# Patient Record
Sex: MALE | Race: OTHER | Employment: UNEMPLOYED | URBAN - METROPOLITAN AREA
[De-identification: names, ages, dates, MRNs, and addresses within clinical notes are randomized per-mention and may not be internally consistent; named-entity substitution may affect disease eponyms.]

---

## 2023-01-10 ENCOUNTER — APPOINTMENT (OUTPATIENT)
Dept: GENERAL RADIOLOGY | Facility: HOSPITAL | Age: 48
End: 2023-01-10

## 2023-01-10 ENCOUNTER — HOSPITAL ENCOUNTER (EMERGENCY)
Facility: HOSPITAL | Age: 48
Discharge: HOME | End: 2023-01-10
Attending: EMERGENCY MEDICINE

## 2023-01-10 ENCOUNTER — HOSPITAL ENCOUNTER (EMERGENCY)
Facility: HOSPITAL | Age: 48
Discharge: STILL ADMITTED OR ADMINISTRATIVE | End: 2023-01-10

## 2023-01-10 VITALS
RESPIRATION RATE: 18 BRPM | OXYGEN SATURATION: 97 % | BODY MASS INDEX: 27.17 KG/M2 | TEMPERATURE: 98 F | HEIGHT: 71.65 IN | WEIGHT: 198.44 LBS | DIASTOLIC BLOOD PRESSURE: 66 MMHG | SYSTOLIC BLOOD PRESSURE: 122 MMHG | HEART RATE: 52 BPM

## 2023-01-10 DIAGNOSIS — S89.91XA RIGHT KNEE INJURY, INITIAL ENCOUNTER: Primary | ICD-10-CM

## 2023-01-10 PROCEDURE — 99283 EMERGENCY DEPT VISIT LOW MDM: CPT

## 2023-01-10 PROCEDURE — 99284 EMERGENCY DEPT VISIT MOD MDM: CPT

## 2023-01-10 PROCEDURE — 96372 THER/PROPH/DIAG INJ SC/IM: CPT

## 2023-01-10 PROCEDURE — 73560 X-RAY EXAM OF KNEE 1 OR 2: CPT

## 2023-01-10 RX ORDER — KETOROLAC TROMETHAMINE 10 MG/1
10 TABLET, FILM COATED ORAL EVERY 6 HOURS PRN
Qty: 20 TABLET | Refills: 0 | Status: SHIPPED | OUTPATIENT
Start: 2023-01-10 | End: 2023-01-15

## 2023-01-10 RX ORDER — KETOROLAC TROMETHAMINE 30 MG/ML
30 INJECTION, SOLUTION INTRAMUSCULAR; INTRAVENOUS ONCE
Status: COMPLETED | OUTPATIENT
Start: 2023-01-10 | End: 2023-01-10

## 2023-01-10 NOTE — ED INITIAL ASSESSMENT (HPI)
The patient reports that he injured his right knee while walking yesterday evening when he felt a \"pop\". He has been unable to ambulate since the event.

## 2023-01-19 ENCOUNTER — EMERGENCY (EMERGENCY)
Facility: HOSPITAL | Age: 48
LOS: 0 days | Discharge: HOME | End: 2023-01-19
Attending: EMERGENCY MEDICINE | Admitting: EMERGENCY MEDICINE
Payer: MEDICAID

## 2023-01-19 VITALS
DIASTOLIC BLOOD PRESSURE: 60 MMHG | OXYGEN SATURATION: 99 % | SYSTOLIC BLOOD PRESSURE: 133 MMHG | TEMPERATURE: 98 F | RESPIRATION RATE: 17 BRPM | HEART RATE: 60 BPM | WEIGHT: 184.97 LBS
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
## 2023-01-19 DIAGNOSIS — M25.561 PAIN IN RIGHT KNEE: ICD-10-CM
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
## 2023-01-19 PROCEDURE — 73562 X-RAY EXAM OF KNEE 3: CPT | Mod: 26,RT

## 2023-01-19 PROCEDURE — 99284 EMERGENCY DEPT VISIT MOD MDM: CPT

## 2023-01-19 NOTE — ED PROVIDER NOTE - PHYSICAL EXAMINATION
CONST: Well appearing in NAD  CARD: Normal S1 S2; Normal rate and rhythm  RESP: Equal BS B/L, No wheezes, rhonchi or rales. No distress  MS: right knee with suprapatellar swelling and tenderness overlying medial aspect. Mild laxity.   SKIN: Warm, dry, no acute rashes. Good turgor  NEURO: A&Ox3, No focal deficits. Strength 5/5 with no sensory deficits.

## 2023-01-19 NOTE — ED PROVIDER NOTE - ATTENDING APP SHARED VISIT CONTRIBUTION OF CARE
47-year-old male with no significant past medical history presents with right knee pain for the past 5 days after he was playing football, states he jumped up and when he landed he twisted his right knee.  Pain has been sharp, constant, nonradiating, worse with movement, better at rest, moderate in intensity.  Patient has not taken anything for the pain and does not want anything.  Associated symptoms include mild right knee swelling.  denies fever, chills, n/v, cp, sob, pleuritic cp pain, palpitations, diaphoresis, numbness/tingling, decreased sensation,  ankle pain, hip pain, lacerations, abrasions, ecchymoses.     On Exam: Vital Signs: I have reviewed the initial vital signs. Constitutional: Non toxic appearing pt sitting on stretcher. Integumentary: No rash. No lacerations, abrasions, ecchymoses. R mild anterior knee swelling. no erythema  or streaking, no warmth to palpation, no crepitus, induration, fluctuance, no discharge, no signs of trauma, no abscess, (+) soft compartments.  Cardiovascular: DP and PT pulses 2/4 b/l. Musculoskeletal: FROM of R knee in flexion, extension, pain worse with flexion of R knee, FROM of R ankle in plantar and dorsi flexion, inversion and eversion, Pain to palpation over R anterior knee, No pain to palpation over ankle, fibular heads, or patella. (-) Anterior and Posterior drawer tests. (-) Bhandari test. (-) Lachman (-) Sasha's. No edema, no calf pain/swelling/erythema. no hip pain to palpation, no short leg, no internal or external rotation of LE. Neurologic: AAOx3, motor 5/5 and sensation intact throughout upper and lower ext.    Plan: pt does not want anything for pain at this time, R Knee xray, reassess.

## 2023-01-19 NOTE — ED PROVIDER NOTE - NS ED ATTENDING STATEMENT MOD
This was a shared visit with the E[facility]RA[facility]. I reviewed and verified the documentation and independently performed the documented:

## 2023-01-19 NOTE — ED PROVIDER NOTE - CLINICAL SUMMARY MEDICAL DECISION MAKING FREE TEXT BOX
47-year-old male with no significant past medical history presents with right knee pain for the past 5 days after he was playing football, states he jumped up and when he landed he twisted his right knee.  Pain has been sharp, constant, nonradiating, worse with movement, better at rest, moderate in intensity.  Patient has not taken anything for the pain and does not want anything.  Associated symptoms include mild right knee swelling.  denies fever, chills, n/v, cp, sob, pleuritic cp pain, palpitations, diaphoresis, numbness/tingling, decreased sensation,  ankle pain, hip pain, lacerations, abrasions, ecchymoses.     On Exam: Vital Signs: I have reviewed the initial vital signs. Constitutional: Non toxic appearing pt sitting on stretcher. Integumentary: No rash. No lacerations, abrasions, ecchymoses. R mild anterior knee swelling. no erythema  or streaking, no warmth to palpation, no crepitus, induration, fluctuance, no discharge, no signs of trauma, no abscess, (+) soft compartments.  Cardiovascular: DP and PT pulses 2/4 b/l. Musculoskeletal: FROM of R knee in flexion, extension, pain worse with flexion of R knee, FROM of R ankle in plantar and dorsi flexion, inversion and eversion, Pain to palpation over R anterior knee, No pain to palpation over ankle, fibular heads, or patella. (-) Anterior and Posterior drawer tests. (-) Bhandari test. (-) Lachman (-) Sasha's. No edema, no calf pain/swelling/erythema. no hip pain to palpation, no short leg, no internal or external rotation  of LE. Neurologic: AAOx3, motor 5/5 and sensation intact throughout upper and lower ext.    Pt with no signs of septic joint, no overlying skin changes. no erythema  or streaking, no warmth to palpation, no crepitus, induration, fluctuance, no discharge, no signs of trauma, no abscess, (+) soft compartments. Xray with no fracture noted.  Imaging was ordered and reviewed by me.  Pt did not want anything for the knee pain. Additional history was obtained from family- wife.  Escalation to admission/observation was considered.  However patient feels much better and is comfortable with discharge.  Appropriate follow-up was arranged. Will follow up with ortho.

## 2023-01-19 NOTE — ED PROVIDER NOTE - NSFOLLOWUPINSTRUCTIONS_ED_ALL_ED_FT
Our Emergency Department Referral Coordinators will be reaching out to you in the next 24-48 hours from 9:00am to 5:00pm with a follow up appointment. Please expect a phone call from the hospital in that time frame. If you do not receive a call or if you have any questions or concerns, you can reach them at [phone] or [phone].        How to Use a Knee Immobilizer    An immobilizer on a person's knee.   A knee immobilizer is a device used to support and protect an injured or painful knee. You may also have to wear it after knee surgery. A knee immobilizer keeps your knee from moving or bending while it is healing. Wear the immobilizer as told by your health care provider. Remove it only as told by your health care provider.    In general, your immobilizer should:  •Have straps, hooks, or tapes that fasten snugly around your leg.      •Not feel too tight or too loose.        What are the risks?    Generally, knee immobilizers are safe to wear. However, problems may occur, including:  •Skin irritation. This may lead to an infection, in rare cases.      •Making your condition worse. This could happen if you wear the immobilizer in the wrong way.        How to use a knee immobilizer    Different immobilizers will have different instructions for use. Your health care provider will show you or tell you:  •How to put on your immobilizer.      •How to adjust your immobilizer.      •When and how often to wear your immobilizer.      •How to remove your immobilizer.      •If you will need any assistive devices in addition to your immobilizer, such as crutches or a cane.        Follow these instructions at home:    Bathing   •If the immobilizer is not waterproof:  •Do not let it get wet.      •Cover it with a watertight covering when you take a bath or shower.      •If your health care provider says that you may remove the immobilizer:  •Take it off before bathing or showering.      •Check for any skin irritation.      •Use a towel to dry the area completely before you put the immobilizer back on.        Managing pain, stiffness, and swelling     •Raise (elevate) the injured area above the level of your heart while you are sitting or lying down. Doing this reduces throbbing and swelling, and helps with healing. You can use pillows for support.    •Loosen the immobilizer if you notice symptoms or signs that it is too tight, such as:  •Swelling.      •Tingling in your toes.      •Numbness.      •Color change on your foot or ankle.      •More pain.        Infection signs     Check any irritations, incisions, or cuts on your skin under the immobilizer every day for signs of infection. Check for:  •More redness, swelling, or pain.      •Fluid or blood.      •Warmth.      •Pus or a bad smell.      General instructions    •Keep the immobilizer clean and dry.      •Return to your normal activities as told by your health care provider. Ask your health care provider what activities are safe for you.      •Check the skin around the immobilizer every day. Tell your health care provider about any concerns.      •Follow your health care provider's instructions about whether you can put any weight on your injured leg. Use crutches or a cane as told.      •Keep all follow-up visits. This is important.        Contact a health care provider if:    •Your knee immobilizer breaks or needs to be replaced.      •You have more pain or swelling in your knee, foot, or ankle.      •Your knee immobilizer is not helping.      •Your knee immobilizer makes your knee pain worse.      •You have any signs of infection of the skin under the immobilizer.        Summary    •A knee immobilizer is used to support and protect an injured or painful knee. You may also have to wear it after knee surgery.      •A knee immobilizer keeps your knee from moving or bending while it is healing.      •Different immobilizers will have different instructions for use. Follow the instructions from your health care provider.      •Contact your health care provider if you have more swelling or pain, if your knee immobilizer breaks, if your knee immobilizer does not help your knee pain or makes pain worse, or if you have any signs of infection.      This information is not intended to replace advice given to you by your health care provider. Make sure you discuss any questions you have with your health care provider.

## 2023-01-19 NOTE — ED PROVIDER NOTE - OBJECTIVE STATEMENT
46yo male with no significant PMHx presents c/o R knee pain s/p mechanical fall 1 week prior. Pt states slipped and R knee "twisted", and then "snapped" back. He reports continued pain, worse with weight bearing, relieved by nothing, Reports persistent swelling.

## 2023-01-19 NOTE — ED PROVIDER NOTE - NSFOLLOWUPCLINICS_GEN_ALL_ED_FT
[facility]  Orthpedic  [address], [place]   Phone: [phone]  Fax:

## 2023-01-19 NOTE — ED PROVIDER NOTE - PROGRESS NOTE DETAILS
pt aware of xray, placed in knee brace, neurovascular intact, aware of proper use of crutches and using properly in ed, aware of signs and symptoms to return for, will follow up with ortho as discussed.

## 2023-01-20 ENCOUNTER — APPOINTMENT (OUTPATIENT)
Dept: ORTHOPEDIC SURGERY | Facility: CLINIC | Age: 48
End: 2023-01-20
Payer: MEDICAID

## 2023-01-20 VITALS — WEIGHT: 198.42 LBS

## 2023-01-20 DIAGNOSIS — M23.611 OTHER SPONTANEOUS DISRUPTION OF ANTERIOR CRUCIATE LIGAMENT OF RIGHT KNEE: ICD-10-CM

## 2023-01-20 PROBLEM — Z00.00 ENCOUNTER FOR PREVENTIVE HEALTH EXAMINATION: Status: ACTIVE | Noted: 2023-01-20

## 2023-01-20 PROCEDURE — 99203 OFFICE O/P NEW LOW 30 MIN: CPT

## 2023-01-20 NOTE — HISTORY OF PRESENT ILLNESS
[de-identified] : The patient is a 47-year-old male accompanied by his spouse here for evaluation of his right knee.  One week ago while playing soccer, he jumped up and when he landed he developed severe pain in his right knee.  He states the bones looked unusual likely were out to the side.  He fell down as well.  He was seen and evaluated [facility] had x-rays of the knee that showed a suprapatellar effusion.  No fracture.  He presents today ambulating with crutches.  He has tried Tylenol and Voltaren gel and that has provided him with some relief.

## 2023-01-20 NOTE — DATA REVIEWED
[Outside X-rays] : outside x-rays [Right] : of the right [Knee] : knee [FreeTextEntry1] :  X-rays taken at [facility] of the right knee showed well-preserved medial and lateral compartments.  No fracture noted.  There was an effusion noted on the x-ray.

## 2023-01-20 NOTE — PHYSICAL EXAM
[Right] : right knee [NL (0)] : extension 0 degrees [Positive] : positive Jesse [] : ambulation with crutches [de-identified] :   He has laxity with Lachman testing. [TWNoteComboBox7] : flexion 120 degrees

## 2023-01-20 NOTE — DISCUSSION/SUMMARY
[de-identified] :   At this point I recommend an MRI of the right knee to evaluate for a medial meniscus tear and ACL tear.  He may continue ambulating with crutches.  He may continue Tylenol and Voltaren gel.  He will call me 2 days after the MRI is performed so we can discuss results, I will see him in 4 weeks for further evaluation.  The MRI is being ordered for surgical planning for an ACL tear.  He understands that if he has an ACL tear we would recommend surgery to reconstruct this ligament.\par \par Supervising physician:  Dr. Suresh

## 2023-01-30 ENCOUNTER — APPOINTMENT (OUTPATIENT)
Dept: MRI IMAGING | Facility: CLINIC | Age: 48
End: 2023-01-30
Payer: MEDICAID

## 2023-01-30 PROCEDURE — 73721 MRI JNT OF LWR EXTRE W/O DYE: CPT | Mod: RT

## 2023-02-02 ENCOUNTER — APPOINTMENT (OUTPATIENT)
Dept: ORTHOPEDIC SURGERY | Facility: CLINIC | Age: 48
End: 2023-02-02
Payer: MEDICAID

## 2023-02-02 PROCEDURE — 99214 OFFICE O/P EST MOD 30 MIN: CPT

## 2023-02-02 NOTE — HISTORY OF PRESENT ILLNESS
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

## 2023-02-13 ENCOUNTER — APPOINTMENT (OUTPATIENT)
Dept: ORTHOPEDIC SURGERY | Facility: CLINIC | Age: 48
End: 2023-02-13

## 2023-03-27 ENCOUNTER — RX RENEWAL (OUTPATIENT)
Age: 48
End: 2023-03-27

## 2023-03-27 RX ORDER — MELOXICAM 15 MG/1
15 TABLET ORAL
Qty: 30 | Refills: 1 | Status: ACTIVE | COMMUNITY
Start: 2023-02-02 | End: 1900-01-01

## 2023-04-03 ENCOUNTER — APPOINTMENT (OUTPATIENT)
Dept: ORTHOPEDIC SURGERY | Facility: CLINIC | Age: 48
End: 2023-04-03
Payer: MEDICAID

## 2023-04-03 DIAGNOSIS — S83.519A SPRAIN OF ANTERIOR CRUCIATE LIGAMENT OF UNSPECIFIED KNEE, INITIAL ENCOUNTER: ICD-10-CM

## 2023-04-03 PROCEDURE — 99214 OFFICE O/P EST MOD 30 MIN: CPT

## 2023-04-03 NOTE — HISTORY OF PRESENT ILLNESS
[de-identified] : Chief complaint: RT knee pain\par \par 48 year old male accompanied by his wife presents for re-evaluation of his RT knee pain. He is a , and has been out of work on 100% total disability. He has an ACL tear and meniscal tear. Has been treating it non-operatively with formal physcial therapy. Has not been wearing a brace, states he could not find one.\par \par On exam full ROM, trace knee effusion over the knee, no erythema, positive Lachman\par \par We discussed all options operative and non-operative, he does not want to go through surgery at this time. Pt will continue formal physical therapy with a home program. He was given a script for a knee brace. His wife is present for this discussion. He will return to work full duty.

## 2023-04-05 DIAGNOSIS — S83.231A COMPLEX TEAR OF MEDIAL MENISCUS, CURRENT INJURY, RIGHT KNEE, INITIAL ENCOUNTER: ICD-10-CM

## 2023-04-05 DIAGNOSIS — T14.8XXA OTHER INJURY OF UNSPECIFIED BODY REGION, INITIAL ENCOUNTER: ICD-10-CM

## 2023-04-24 ENCOUNTER — APPOINTMENT (OUTPATIENT)
Dept: ORTHOPEDIC SURGERY | Facility: CLINIC | Age: 48
End: 2023-04-24

## 2023-05-22 ENCOUNTER — RX RENEWAL (OUTPATIENT)
Age: 48
End: 2023-05-22

## 2023-10-25 ENCOUNTER — ORDER TRANSCRIPTION (OUTPATIENT)
Dept: PHYSICAL THERAPY | Facility: HOSPITAL | Age: 48
End: 2023-10-25

## 2023-10-25 DIAGNOSIS — M25.561 RIGHT KNEE PAIN: Primary | ICD-10-CM

## 2023-10-30 ENCOUNTER — TELEPHONE (OUTPATIENT)
Dept: PHYSICAL THERAPY | Age: 48
End: 2023-10-30

## 2023-11-01 ENCOUNTER — TELEPHONE (OUTPATIENT)
Dept: PHYSICAL THERAPY | Facility: HOSPITAL | Age: 48
End: 2023-11-01

## 2023-11-02 ENCOUNTER — LAB ENCOUNTER (OUTPATIENT)
Dept: LAB | Age: 48
End: 2023-11-02
Attending: FAMILY MEDICINE
Payer: COMMERCIAL

## 2023-11-02 ENCOUNTER — OFFICE VISIT (OUTPATIENT)
Dept: PHYSICAL THERAPY | Age: 48
End: 2023-11-02
Attending: FAMILY MEDICINE
Payer: COMMERCIAL

## 2023-11-02 DIAGNOSIS — M25.561 ACUTE PAIN OF RIGHT KNEE: Primary | ICD-10-CM

## 2023-11-02 DIAGNOSIS — Z00.00 GENERAL MEDICAL EXAMINATION: Primary | ICD-10-CM

## 2023-11-02 LAB
ALBUMIN SERPL-MCNC: 4.4 G/DL (ref 3.2–4.8)
ALBUMIN/GLOB SERPL: 1.8 {RATIO} (ref 1–2)
ALP LIVER SERPL-CCNC: 420 U/L
ALT SERPL-CCNC: 15 U/L
ANION GAP SERPL CALC-SCNC: 11 MMOL/L (ref 0–18)
AST SERPL-CCNC: 22 U/L (ref ?–34)
BASOPHILS # BLD AUTO: 0.03 X10(3) UL (ref 0–0.2)
BASOPHILS NFR BLD AUTO: 0.5 %
BILIRUB SERPL-MCNC: 0.8 MG/DL (ref 0.3–1.2)
BUN BLD-MCNC: 18 MG/DL (ref 9–23)
BUN/CREAT SERPL: 20.2 (ref 10–20)
CALCIUM BLD-MCNC: 9.2 MG/DL (ref 8.7–10.4)
CHLORIDE SERPL-SCNC: 106 MMOL/L (ref 98–112)
CHOLEST SERPL-MCNC: 181 MG/DL (ref ?–200)
CO2 SERPL-SCNC: 23 MMOL/L (ref 21–32)
CREAT BLD-MCNC: 0.89 MG/DL
DEPRECATED RDW RBC AUTO: 37.1 FL (ref 35.1–46.3)
EGFRCR SERPLBLD CKD-EPI 2021: 107 ML/MIN/1.73M2 (ref 60–?)
EOSINOPHIL # BLD AUTO: 0.09 X10(3) UL (ref 0–0.7)
EOSINOPHIL NFR BLD AUTO: 1.6 %
ERYTHROCYTE [DISTWIDTH] IN BLOOD BY AUTOMATED COUNT: 12.3 % (ref 11–15)
FASTING PATIENT LIPID ANSWER: NO
FASTING STATUS PATIENT QL REPORTED: NO
GLOBULIN PLAS-MCNC: 2.4 G/DL (ref 2.8–4.4)
GLUCOSE BLD-MCNC: 95 MG/DL (ref 70–99)
HCT VFR BLD AUTO: 40.9 %
HDLC SERPL-MCNC: 61 MG/DL (ref 40–59)
HGB BLD-MCNC: 14 G/DL
IMM GRANULOCYTES # BLD AUTO: 0.01 X10(3) UL (ref 0–1)
IMM GRANULOCYTES NFR BLD: 0.2 %
LDLC SERPL CALC-MCNC: 100 MG/DL (ref ?–100)
LYMPHOCYTES # BLD AUTO: 1.58 X10(3) UL (ref 1–4)
LYMPHOCYTES NFR BLD AUTO: 27.4 %
MCH RBC QN AUTO: 28.7 PG (ref 26–34)
MCHC RBC AUTO-ENTMCNC: 34.2 G/DL (ref 31–37)
MCV RBC AUTO: 83.8 FL
MONOCYTES # BLD AUTO: 0.44 X10(3) UL (ref 0.1–1)
MONOCYTES NFR BLD AUTO: 7.6 %
NEUTROPHILS # BLD AUTO: 3.61 X10 (3) UL (ref 1.5–7.7)
NEUTROPHILS # BLD AUTO: 3.61 X10(3) UL (ref 1.5–7.7)
NEUTROPHILS NFR BLD AUTO: 62.7 %
NONHDLC SERPL-MCNC: 120 MG/DL (ref ?–130)
OSMOLALITY SERPL CALC.SUM OF ELEC: 292 MOSM/KG (ref 275–295)
PLATELET # BLD AUTO: 219 10(3)UL (ref 150–450)
POTASSIUM SERPL-SCNC: 4 MMOL/L (ref 3.5–5.1)
PROT SERPL-MCNC: 6.8 G/DL (ref 5.7–8.2)
RBC # BLD AUTO: 4.88 X10(6)UL
SODIUM SERPL-SCNC: 140 MMOL/L (ref 136–145)
TRIGL SERPL-MCNC: 115 MG/DL (ref 30–149)
TSI SER-ACNC: 0.95 MIU/ML (ref 0.55–4.78)
VLDLC SERPL CALC-MCNC: 19 MG/DL (ref 0–30)
WBC # BLD AUTO: 5.8 X10(3) UL (ref 4–11)

## 2023-11-02 PROCEDURE — 97161 PT EVAL LOW COMPLEX 20 MIN: CPT

## 2023-11-02 PROCEDURE — 85025 COMPLETE CBC W/AUTO DIFF WBC: CPT

## 2023-11-02 PROCEDURE — 84443 ASSAY THYROID STIM HORMONE: CPT

## 2023-11-02 PROCEDURE — 97110 THERAPEUTIC EXERCISES: CPT

## 2023-11-02 PROCEDURE — 80053 COMPREHEN METABOLIC PANEL: CPT

## 2023-11-02 PROCEDURE — 36415 COLL VENOUS BLD VENIPUNCTURE: CPT

## 2023-11-02 PROCEDURE — 80061 LIPID PANEL: CPT

## 2023-11-07 ENCOUNTER — OFFICE VISIT (OUTPATIENT)
Dept: PHYSICAL THERAPY | Age: 48
End: 2023-11-07
Payer: COMMERCIAL

## 2023-11-07 PROCEDURE — 97110 THERAPEUTIC EXERCISES: CPT

## 2023-11-07 PROCEDURE — 97112 NEUROMUSCULAR REEDUCATION: CPT

## 2023-11-07 NOTE — PROGRESS NOTES
Diagnosis:   Right knee pain (M25.561)          Referring Provider: No ref. provider found  Date of Evaluation:    11/2/23    Precautions:  None Next MD visit:   none scheduled  Date of Surgery: n/a   Insurance Primary/Secondary: N/A / N/A     # Auth Visits: 6 visits auth until             Subjective: Patient reports that the exercises went ok at home no problems; there is not a lot of knee pain right now. He states that he did have an incident in the knee in the winter last year and he brought in his MRI report. His knee does feel unstable if he tries to cut and pivot    Pain: 0/10      Objective: See treatment log    MRI - ruptured and retracted ACL and medial and lateral meniscal tearing. Assessment: Patient had minimal pain and instability in the knee with mini squats on the BB this visit due to ACL insufficiency. Educated the patient on inherent instability noted with coping with ACL tear; will focus on strengthening to help improve his strength and also his proprioceptive control across the knee to allow him better dynamic knee stability. Goals:   Goals: (to be met in 6 visits)  1. Patient will be independent in a progressive HEP to help manage symptoms and achieve functional independence in 3 sessions. 2. Patient will decrease his max pain to 3/10 as needed to improve his functional independence in 3 weeks. 3. Patient will demonstrate good knee and hip control with 10 lateral step downs to allow him decreased pain with stair descent  4. Patient will increase his knee flexion to greater than 135 to allow him to squat to the ground during daily chores and work duties without knee pain  5. Patient will increase his glut med strength to 4+/5 as needed to decrease pain in the knee with walking, stairs and squatting. Plan: Continue to focus on strenght and stability in the knee; progress with curtsy squats, SL TKE and balance progression.     Date:    11/7/23             TX#: 3/6  Date: TX#: 4/ Date:                 TX#: 5/   Man       Ther ex  Stationary bike x 4 min, level 5   Standing ITB stretch 3 x 10\" R  Standing hip flexor stretch 2 x 10\" R  Side clams RTB x 20 R/L     Lateral walks RTB 2 laps  Lateral step down 2 x 10 6 inch  Ant step up to bosu x 15  Lat step up to bosu x 15     5 min patient education on instability associated with ACL rupture - copper cs non-copper     NMR SLR with ER 2 x 10 R     SLB with cone touches 2 x 10 on right  BB center balance x 1 min  BB mini squats x 15         Ther act       HEP: Access Code: [email or web address]  URL: [email or web address]  Date: 11/07/2023  Prepared by: Senthil Nails  Exercises  - Side Stepping with Resistance at Ankles  - 1 x daily - 7 x weekly - 3 sets - 10 reps  - Lateral Step Down  - 1 x daily - 7 x weekly - 3 sets - 10 reps  - Single Leg Cone Touch  - 1 x daily - 7 x weekly - 2 sets - 10 reps       Charges: 1 NMR( 8 min) 2 ther ex (23 min)       Total Timed Treatment: 35  min  Total Treatment Time: 35 min    Certification From: 64/3/8534  To:1/31/2024

## 2023-11-10 ENCOUNTER — TELEPHONE (OUTPATIENT)
Dept: PHYSICAL THERAPY | Facility: HOSPITAL | Age: 48
End: 2023-11-10

## 2023-11-21 ENCOUNTER — TELEPHONE (OUTPATIENT)
Dept: PHYSICAL THERAPY | Facility: HOSPITAL | Age: 48
End: 2023-11-21

## 2023-11-28 ENCOUNTER — OFFICE VISIT (OUTPATIENT)
Dept: PHYSICAL THERAPY | Age: 48
End: 2023-11-28
Payer: COMMERCIAL

## 2023-11-28 PROCEDURE — 97110 THERAPEUTIC EXERCISES: CPT

## 2023-11-28 PROCEDURE — 97112 NEUROMUSCULAR REEDUCATION: CPT

## 2023-11-28 NOTE — PROGRESS NOTES
Diagnosis:   Right knee pain (M25.561)          Referring Provider: Fay Garcia  Date of Evaluation:    11/2/23    Precautions:  None Next MD visit:   none scheduled  Date of Surgery: n/a   Insurance Primary/Secondary: N/A / N/A     # Auth Visits: 6 visits auth until             Subjective: Patient reports that the exercises are good at home. He is currently not having any knee pain and he has not had any buckling in the knee. He is going to try to play football later today. He does feel that he is feeling a little bit weak still in the right knee    Pain: 0/10      Objective: See treatment log    MRI - ruptured and retracted ACL and medial and lateral meniscal tearing. Assessment:  Continued with lateral step downs to promote increased eccentric quad strenght and continued with proprioceptive exercises to help promote increased knee stability; he did have minimal pain in the knee with the lateral step downs due to decreased eccentric strength and instability in the sagittal plane. Need to continue with strenght progression to improve subjective stability. Goals:   Goals: (to be met in 6 visits)  1. Patient will be independent in a progressive HEP to help manage symptoms and achieve functional independence in 3 sessions. 2. Patient will decrease his max pain to 3/10 as needed to improve his functional independence in 3 weeks. 3. Patient will demonstrate good knee and hip control with 10 lateral step downs to allow him decreased pain with stair descent  4. Patient will increase his knee flexion to greater than 135 to allow him to squat to the ground during daily chores and work duties without knee pain  5. Patient will increase his glut med strength to 4+/5 as needed to decrease pain in the knee with walking, stairs and squatting.     Plan: Continue to focus on strenght and stability in the knee; progress with strength with chair squats, step down reps and wall sits   Date:    11/7/23             TX#: 3/6 Date:     11/28/23            TX#: 4/6 Date:                 TX#: 5/   Man       Ther ex  Stationary bike x 4 min, level 5   Standing ITB stretch 3 x 10\" R  Standing hip flexor stretch 2 x 10\" R  Side clams RTB x 20 R/L     Lateral walks RTB 2 laps  Lateral step down 2 x 10 6 inch  Ant step up to bosu x 15  Lat step up to bosu x 15     5 min patient education on instability associated with ACL rupture - coper cs non-coper Stationary bike x 4 min, level 5     Side clams RTB x 20 R/L     Lateral walks RTB 2 laps  Lateral step down 2 x 10 6 inch  Ant step up to bosu x 15  Lat step up to bosu x 15  Curtsy squats  2 x 10 R/L   Reaches: A, L and P x 10 on right  Shuttle side lying SLP 2 x 15 5 cord R  SL TKE x 12, 5\"  with Blue TB  SLB cone taps 3 cones x 5 laps    NMR SLR with ER 2 x 10 R     SLB with cone touches 2 x 10 on right  BB center balance x 1 min  BB mini squats x 15     SLR with ER 2 x 15 R 1#    SLB Airex with rebounder throws x 15      SLB with cone touches with UE x 5 on right  BB center balance x 2 min  BB mini squats x 15  SLB on the dynadisk 3 x 30\"    Ther act       HEP: Access Code: [email or web address]  URL: [email or web address]  Date: 11/07/2023  Prepared by: Alan Hilton  Exercises  - Side Stepping with Resistance at Ankles  - 1 x daily - 7 x weekly - 3 sets - 10 reps  - Lateral Step Down  - 1 x daily - 7 x weekly - 3 sets - 10 reps  - Single Leg Cone Touch  - 1 x daily - 7 x weekly - 2 sets - 10 reps       Charges: 1 NMR (14 min) 2 ther ex (23 min)       Total Timed Treatment: 37  min  Total Treatment Time: 38 min    Certification From: 70/4/9491  To:1/31/2024

## 2023-12-05 ENCOUNTER — APPOINTMENT (OUTPATIENT)
Dept: PHYSICAL THERAPY | Age: 48
End: 2023-12-05
Payer: COMMERCIAL

## 2024-05-05 ENCOUNTER — HOSPITAL ENCOUNTER (EMERGENCY)
Facility: HOSPITAL | Age: 49
Discharge: HOME | End: 2024-05-05
Attending: EMERGENCY MEDICINE
Payer: COMMERCIAL

## 2024-05-05 ENCOUNTER — APPOINTMENT (OUTPATIENT)
Dept: GENERAL RADIOLOGY | Facility: HOSPITAL | Age: 49
End: 2024-05-05
Payer: COMMERCIAL

## 2024-05-05 VITALS
HEART RATE: 72 BPM | TEMPERATURE: 98 F | SYSTOLIC BLOOD PRESSURE: 116 MMHG | RESPIRATION RATE: 18 BRPM | OXYGEN SATURATION: 97 % | DIASTOLIC BLOOD PRESSURE: 71 MMHG | BODY MASS INDEX: 28 KG/M2 | WEIGHT: 205 LBS

## 2024-05-05 DIAGNOSIS — S83.91XA SPRAIN OF RIGHT KNEE, UNSPECIFIED LIGAMENT, INITIAL ENCOUNTER: Primary | ICD-10-CM

## 2024-05-05 PROCEDURE — 99283 EMERGENCY DEPT VISIT LOW MDM: CPT

## 2024-05-05 PROCEDURE — 99284 EMERGENCY DEPT VISIT MOD MDM: CPT

## 2024-05-05 PROCEDURE — 73560 X-RAY EXAM OF KNEE 1 OR 2: CPT | Performed by: EMERGENCY MEDICINE

## 2024-05-05 RX ORDER — IBUPROFEN 600 MG/1
600 TABLET ORAL ONCE
Status: COMPLETED | OUTPATIENT
Start: 2024-05-05 | End: 2024-05-05

## 2024-05-05 RX ORDER — HYDROCODONE BITARTRATE AND ACETAMINOPHEN 5; 325 MG/1; MG/1
1 TABLET ORAL ONCE
Status: COMPLETED | OUTPATIENT
Start: 2024-05-05 | End: 2024-05-05

## 2024-05-05 RX ORDER — IBUPROFEN 600 MG/1
600 TABLET ORAL EVERY 8 HOURS PRN
Qty: 15 TABLET | Refills: 0 | Status: SHIPPED | OUTPATIENT
Start: 2024-05-05 | End: 2024-05-10

## 2024-05-05 RX ORDER — HYDROCODONE BITARTRATE AND ACETAMINOPHEN 5; 325 MG/1; MG/1
1 TABLET ORAL EVERY 6 HOURS PRN
Qty: 10 TABLET | Refills: 0 | Status: SHIPPED | OUTPATIENT
Start: 2024-05-05

## 2024-05-06 NOTE — ED QUICK NOTES
Patient safe to DC home per MD. DC instructions reviewed with patient and family member, including when and how to follow up. Patient and family member and verbalizes understanding.

## 2024-05-06 NOTE — ED PROVIDER NOTES
Patient Seen in: [facility] Emergency Department      History     Chief Complaint   Patient presents with    Leg or Foot Injury     Stated Complaint: right leg pain    Subjective:   HPI    Relatively healthy 49-year-old male presents with his wife and son for evaluation of a right knee injury.  He was playing soccer this evening and kick without foot and suffered an injury.  He reports most pain medially.  He has trouble with extension and flexion.  No prior surgeries to this knee.  No distal paresthesias.    Objective:   History reviewed. No pertinent past medical history.           History reviewed. No pertinent surgical history.             Social History     Socioeconomic History    Marital status:               Review of Systems    Positive for stated complaint: right leg pain  Other systems are as noted in HPI.  Constitutional and vital signs reviewed.      All other systems reviewed and negative except as noted above.    Physical Exam     ED Triage Vitals [05/05/24 1850]   /71   Pulse 72   Resp 18   Temp 98 °F (36.7 °C)   Temp src Temporal   SpO2 97 %   O2 Device None (Room air)       Current:/71   Pulse 72   Temp 98 °F (36.7 °C) (Temporal)   Resp 18   Wt 93 kg   SpO2 97%   BMI 28.07 kg/m²         Physical Exam    Constitutional: Oriented to person, place, and time.  Appears well-developed. No distress.   Head: Normocephalic and atraumatic.   Eyes: Conjunctivae are normal. Pupils are equal, round, and reactive to light.   Cardiovascular: Normal rate, regular rhythm and intact distal pulses.    Musculoskeletal: No significant noted effusion.  Pain with range of motion.  Lower leg compartments are soft.  No distal paresthesias.  Motor intact distally.  Strong dorsalis pedis pulse.  He has pain medially and with Lachman and mildly with valgus testing.  There is no gross joint instability.  The thigh compartments are soft.  The extensor mechanism is intact but limited secondary  to pain  Neurological: Alert and oriented to person, place, and time.   Skin: Skin is warm and dry.   Nursing note and vitals reviewed.    Differential diagnosis includes right knee sprain, less likely occult fracture or tendon injury.      ED Course   Labs Reviewed - No data to display          XR KNEE (1 OR 2 VIEWS), RIGHT (CPT=73560)    Result Date: 5/5/2024  PROCEDURE: XR KNEE (1 OR 2 VIEWS), RIGHT (CPT=73560)  COMPARISON: [facility], XR KNEE (1 OR 2 VIEWS), RIGHT (CPT=73560), 1/10/2023, 12:54 PM.  INDICATIONS: Right anterior knee pain pst-fall while playing soccer today.  TECHNIQUE: 2 nonweightbearing views were obtained.   FINDINGS:  BONES: No acute fracture subluxation.  Minimal degenerative change of.  Small enthesophyte at the quadriceps attachment SOFT TISSUES: Negative. No visible soft tissue swelling. EFFUSION: None visible. OTHER: Negative.         CONCLUSION:  1. No acute fracture or subluxation.    Dictated by (CST): Rafael Moody MD on 5/05/2024 at 7:25 PM     Finalized by (CST): Rafael Moody MD on 5/05/2024 at 7:26 PM                  MDM                                         Medical Decision Making  Recommended ongoing ice.  Ace immobilizer and crutches given.  Ortho follow-up.  Over-the-counter Tylenol and ibuprofen for pain where he can use prescribed indications.  Will keep it elevated as well.  He will come back with worsening or change.  Neurovascularly intact.    Problems Addressed:  Sprain of right knee, unspecified ligament, initial encounter: acute illness or injury    Amount and/or Complexity of Data Reviewed  Radiology: ordered and independent interpretation performed. Decision-making details documented in ED Course.     Details: By my gross review of the right knee x-ray did not appreciate gross and obvious evidence of fracture or bony malalignment    Risk  OTC drugs.  Prescription drug management.        Disposition and Plan     Clinical Impression:  1. Sprain of  right knee, unspecified ligament, initial encounter         Disposition:  Discharge  5/5/2024  7:43 pm    Follow-up:  [facility]O [facility]  [address] [place] [id number] [phone]  Schedule an appointment as soon as possible for a visit in 3 day(s)  As needed          Medications Prescribed:  Current Discharge Medication List        START taking these medications    Details   ibuprofen 600 MG Oral Tab Take 1 tablet (600 mg total) by mouth every 8 (eight) hours as needed for Pain or Fever.  Qty: 15 tablet, Refills: 0      HYDROcodone-acetaminophen 5-325 MG Oral Tab Take 1 tablet by mouth every 6 (six) hours as needed.  Qty: 10 tablet, Refills: 0    Associated Diagnoses: Sprain of right knee, unspecified ligament, initial encounter

## 2024-05-14 ENCOUNTER — HOSPITAL ENCOUNTER (OUTPATIENT)
Dept: MRI IMAGING | Age: 49
Discharge: HOME | End: 2024-05-14
Attending: FAMILY MEDICINE

## 2024-05-14 DIAGNOSIS — M25.561 RIGHT KNEE PAIN: ICD-10-CM

## 2024-05-14 PROCEDURE — 73721 MRI JNT OF LWR EXTRE W/O DYE: CPT | Performed by: FAMILY MEDICINE

## 2024-05-20 ENCOUNTER — TELEPHONE (OUTPATIENT)
Dept: ORTHOPEDICS CLINIC | Facility: CLINIC | Age: 49
End: 2024-05-20

## 2024-05-20 NOTE — TELEPHONE ENCOUNTER
Patient is coming in for right knee pain.    XRAY and MRI in [facility].    Patient was consulted for possible surgery.    Please advise if anything additional is needed    Future Appointments   Date Time Provider Department Center   5/24/2024 11:50 AM Sh[facility] Nugent MD [id number]

## 2024-05-24 ENCOUNTER — OFFICE VISIT (OUTPATIENT)
Dept: ORTHOPEDICS CLINIC | Facility: CLINIC | Age: 49
End: 2024-05-24

## 2024-05-24 ENCOUNTER — TELEPHONE (OUTPATIENT)
Dept: PHYSICAL THERAPY | Facility: HOSPITAL | Age: 49
End: 2024-05-24

## 2024-05-24 VITALS — WEIGHT: 205 LBS | BODY MASS INDEX: 28.7 KG/M2 | HEIGHT: 71 IN

## 2024-05-24 DIAGNOSIS — S83.281A ACUTE LATERAL MENISCUS TEAR OF RIGHT KNEE, INITIAL ENCOUNTER: ICD-10-CM

## 2024-05-24 DIAGNOSIS — S83.511A RUPTURE OF ANTERIOR CRUCIATE LIGAMENT OF RIGHT KNEE, INITIAL ENCOUNTER: Primary | ICD-10-CM

## 2024-05-24 DIAGNOSIS — M65.9 SYNOVITIS OF KNEE: ICD-10-CM

## 2024-05-24 DIAGNOSIS — S83.211A BUCKET-HANDLE TEAR OF MEDIAL MENISCUS OF RIGHT KNEE AS CURRENT INJURY, INITIAL ENCOUNTER: ICD-10-CM

## 2024-05-24 PROCEDURE — 99205 OFFICE O/P NEW HI 60 MIN: CPT | Performed by: ORTHOPAEDIC SURGERY

## 2024-05-24 PROCEDURE — 3008F BODY MASS INDEX DOCD: CPT | Performed by: ORTHOPAEDIC SURGERY

## 2024-05-24 RX ORDER — IBUPROFEN 600 MG/1
TABLET ORAL
COMMUNITY
Start: 2024-05-05

## 2024-05-24 NOTE — H&P
Orthopaedic Surgery  [address] [place] [id number]  [phone]     PRE SURGICAL - HISTORY AND PHYSICAL EXAMINATION     Name: Magnolia Joseph   MRN: [id number]  Date: 5/24/2024     CC: Right Knee Pain and instability     REFERRED BY: Self    HPI:   Magnolia Joseph is a very pleasant 49 year old male who presents today for evaluation of Right knee pain and instability and recent completion of MRI demonstrating anterior cruciate ligament rupture.     To summarize: Right knee injury that occurred after playing soccer in which she had significant difficulty with weightbearing.  He has since had locking and weakness of his knee and feels unstable.  He presented to the [facility] ED and was referred for orthopedic evaluation.  MRI demonstrated a proximal ACL tear and bucket-handle medial meniscus tear, and lateral mensicus tear.  He presents today for further evaluation and management.    He works as a .    PMH:   No past medical history on file.    PAST SURGICAL HX:  No past surgical history on file.    FAMILY HX:  No family history on file.    ALLERGIES:  Patient has no known allergies.    MEDICATIONS:   Current Outpatient Medications   Medication Sig Dispense Refill    ibuprofen 600 MG Oral Tab       HYDROcodone-acetaminophen 5-325 MG Oral Tab Take 1 tablet by mouth every 6 (six) hours as needed. 10 tablet 0       ROS: A comprehensive 14 point review of systems was performed and was negative aside from the aforementioned per history of present illness.    SOCIAL HX:  Social History     Tobacco Use    Smoking status: Former     Types: Cigarettes    Smokeless tobacco: Never   Substance Use Topics    Alcohol use: Not on file       PE:   Vitals:    05/24/24 1139   Weight: 205 lb (93 kg)   Height: 5' 11\" (1.803 m)     Estimated body mass index is 28.59 kg/m² as calculated from the following:    Height as of this encounter: 5' 11\" (1.803 m).    Weight as of this encounter: 205 lb (93  kg).    Physical Exam  Constitutional:       Appearance: Normal appearance.   HENT:      Head: Normocephalic and atraumatic.   Eyes:      Extraocular Movements: Extraocular movements intact.   Neck:      Musculoskeletal: Normal range of motion and neck supple.   Cardiovascular:      Pulses: Normal pulses.   Pulmonary:      Effort: Pulmonary effort is normal. No respiratory distress.   Abdominal:      General: There is no distension.   Skin:     General: Skin is warm.      Capillary Refill: Capillary refill takes less than 2 seconds.      Findings: No bruising.   Neurological:      General: No focal deficit present.      Mental Status: Alert.   Psychiatric:         Mood and Affect: Mood normal.     Examination of the right knee demonstrates:     Skin is intact, warm and dry.   Atrophy: mild    Effusion: moderate    Joint line tenderness: both  Crepitation: none   Miracle: Positive   Patellar mobility: normal without apprehension  J-sign: none    ROM: Extension lacking 10 degrees  Flexion 90 degrees  ACL:  2B Lachman, 2+ Pivot Shift   PCL:  Negative Posterior Drawer  Collateral Ligaments: Stable to Varus and Valgus stress at 0 and 30 degrees  Strength: mild weakness   Hip joint: normal pain-free ROM   Gait:  using assistive device   Leg length: equal and symmetric  Alignment:  neutral     No obvious peripheral edema noted.   Distal neurovascular exam demonstrates normal perfusion, intact sensation to light touch and full strength.     Examination of the contralateral knee demonstrates:  No significant atrophy, swelling or effusion. Full range of motion. Neurovascularly intact distally.    Radiographic Examination/Diagnostics:  XR and MRI of the knee personally viewed, independently interpreted and radiology report was reviewed.    MRI KNEE, RIGHT (AKF=83125)    Result Date: 5/14/2024  PROCEDURE:  MRI KNEE, RIGHT (CPT=73721)  COMPARISON:  None.  INDICATIONS:  Status post injury presenting with anterior right knee  pain.  TECHNIQUE:  Axial, coronal, and sagittal proton density with and without fat saturation images were obtained.  PATIENT STATED HISTORY: (As transcribed by Technologist)  Patient states aneriot  right knee pain    FINDINGS:  LIGAMENTS:          The ACL orientation is somewhat abnormal, with ill definition of the femoral insertion, suspicious for a proximal ACL tear.  The PCL and collateral ligaments are intact and within normal limits.  The patellofemoral ligaments are unremarkable. MENISCI:            There is a large bucket-handle tear of the medial meniscus with displaced meniscal tissue extending centrally/laterally into the intercondylar notch.  The lateral meniscus reveals a subtle longitudinal oblique tear of the posterior horn extending to the superior articular surface.  There is also evidence of a longitudinal oblique tear of the anterior horn extending to the superior articular surface. TENDONS:            The tendinous insertions about the knee are intact without significant tendinosis or tears. MUSCULATURE:        No evidence of strain, edema, or atrophy. BONY COMPARTMENTS:  There is mild chondromalacia versus mild osteoarthritis along the posterior articular surface of the lateral femoral condyle where there is moderate grade articular cartilage loss.  There is mild subchondral edema in this location.  No discrete evidence of acute osseous injuries. SYNOVIUM:           Large joint effusion.  No intra-articular bodies.             CONCLUSION:  1. There is an abnormal, somewhat horizontally oriented ACL with ill definition of the femoral insertion suspicious for a proximal ACL tear.   2. There is a large bucket-handle tear of the medial meniscus with displaced meniscal tissue extending centrally/laterally into the intercondylar notch.   3. Subtle longitudinal oblique tears involving the posterior horn and anterior horn of the lateral meniscus, both of which appear to extend to the superior articular  surfaces.   4. Large joint effusion. 5. 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   LOCATION:  [facility]          Dictated by (CST): Kirk Harkins DO on 5/14/2024 at 5:03 PM     Finalized by (CST): Kirk Harkins DO on 5/14/2024 at 5:07 PM       XR KNEE (1 OR 2 VIEWS), RIGHT (CPT=73560)    Result Date: 5/5/2024  PROCEDURE: XR KNEE (1 OR 2 VIEWS), RIGHT (CPT=73560)  COMPARISON: [facility], XR KNEE (1 OR 2 VIEWS), RIGHT (CPT=73560), 1/10/2023, 12:54 PM.  INDICATIONS: Right anterior knee pain pst-fall while playing soccer today.  TECHNIQUE: 2 nonweightbearing views were obtained.   FINDINGS:  BONES: No acute fracture subluxation.  Minimal degenerative change of.  Small enthesophyte at the quadriceps attachment SOFT TISSUES: Negative. No visible soft tissue swelling. EFFUSION: None visible. OTHER: Negative.         CONCLUSION:  1. No acute fracture or subluxation.    Dictated by (CST): Rafael Moody MD on 5/05/2024 at 7:25 PM     Finalized by (CST): Rafael Moody MD on 5/05/2024 at 7:26 PM            IMPRESSION: Magnolia Joseph is a 49 year old male with anterior cruciate ligament rupture.  To successfully achieve goals of return to contact sports and high-level multidirectional functional activity, I advocate for anterior cruciate ligament reconstruction. The preferred graft for ACL reconstruction in this setting is Patellar tendon (BTB) autograft.     He has additional concurrent medial and lateral meniscus pathology.  This will be addressed with repair.  Large joint effusion and inflammatory changes with synovitis to be managed with synovectomy.    PLAN:   We had a detailed discussion outlining the etiology, anatomy, pathophysiology, and natural history of anterior cruciate ligament injuries. Imaging was reviewed in detail and correlated to a 3-dimensional model of the knee.     I had a detailed discussion with the patient and their family regarding an  overview in approach to anterior cruciate ligament injuries. We discussed the role of possible nonsurgical management versus operative ACL reconstruction, ultimately, we agreed that proceeding with surgical intervention would likely offer the best opportunity for symptomatic relief and functional recovery.     I used radiographic studies and a 3-dimensional model to outline his pathology, as well as general surgical principles. I outlined that the ACL is a dynamic structure that loses integrity after injury, which does not render it capable of repair.  As such, reconstruction is required with a separate structure to take the place of the native ACL.  This can be accomplished via allograft, cadaveric tissue, or autograft-the patient's native tissue.  My recommendation is to proceed with autograft reconstruction as this is the gold standard for return to play, healing rates, and reduced risk of reinjury.    Amongst different types of graft choices for anterior cruciate ligament reconstruction, I outlined patellar tendon autograft which includes 2 small 20 mm segments of bone from the patella and tibia, quadriceps tendon autograft which is in all soft tissue graft, and hamstring tendon autograft which involves the semitendinosis and gracilis tendons.  Each of these graft choices has its own strengths and weaknesses.  For a high energy and impact athlete such as Magnolia, I recommended patellar tendon autograft as this has the least donor site morbidity and quickest healing/return to play based on the literature with a low reinjury risk.     We reviewed the risks associated with arthroscopic-assisted anterior cruciate ligament (ACL) reconstruction. In particular we discussed risks that include, but are not limited to infection, potential transient or permanent injury to nerves or blood vessels, joint stiffness, persistent pain, need for future operation, failure of healing, wound complications, failure of therapeutic  intervention, risk of re-injury, fixation failure, deep vein thrombosis and pulmonary embolism. We discussed the proposed rehabilitation timeline as well as expected postoperative restrictions. Magnolia voiced a good understanding of treatment options, risks and benefits, postoperative instructions, rehabilitation timeline, and restrictions. He was given the opportunity to ask questions, which were all answered to the best of my ability and to his satisfaction. Magnolia will work with my office to arrange a time for surgery and obtain any medical clearance information necessary. My pre-operative information packet, which details the process and answers many FAQ's will be provided. He was encouraged to call the office with any further questions or concerns.    I spent 60 minutes in preparation to see the patient, counseling/education of relevant pathology, discussing imaging results, ordering physical therapy intervention - including pre and post surgery, DME fitting, surgical counseling and care coordination.        FOLLOW-UP:  Post-Operative Visit, POD 6 with Sincer MESHA Velasquez PA-C. Post op XR of the knee at this visit.       Shaji Nugent MD  Knee, Shoulder, & Elbow Surgery / Sports Medicine Specialist  Orthopaedic Surgery  [address] [place] [address].org  [email or web address]  t: [phone]  o: [phone]  f: [phone]    This note was dictated using Dragon software.  While it was briefly proofread prior to completion, some grammatical, spelling, and word choice errors due to dictation may still occur.

## 2024-05-24 NOTE — PROGRESS NOTES
OR BOOKING SHEET KNEE  Location: [] [facility]   [x] [facility]   Name: Magnolia Joseph  MRN: [id number]   : 1975  Diagnosis:  [x] Rupture of anterior cruciate ligament of right knee, initial encounter [S83.511A]  Disposition:    [x] Ambulatory  [] Overnight for LESLYE  [] Overnight for observation and pain control  [] Inpatient procedure    Operative Time Required: 2.5 hours  Procedure:  Antibiotics: 2 g cefazolin within 60 minutes of surgical incision (3 g if > 120 kg)  Laterality: [x] RIGHT KNEE [] LEFT KNEE                      [] BILATERAL  Procedures:   [x] Arthroscopy       [x] ACL Reconstruction (05888)    [x] BTB autograft      [] Quad autograft             [] Hamstring autograft     [] Partial Medial Meniscectomy (63079)   [x] Medial Meniscus Repair (41679)   [] Partial Lateral Meniscectomy (72022)   [x] Lateral Meniscus Repair (98887)       [x] Synovectomy (47464)    Additional info:   [] PCP Clearance Needed  [] Mini C - arm  [x] TXA at Time of Surgery  [x] Physical Therapy [facility]  [x] DME Rx Needed  [] Appt with Dr. Nugent needed  Implants needed: Arthrex / [employer]  Positioning Equipment: Supine with Lateral Post

## 2024-05-28 ENCOUNTER — OFFICE VISIT (OUTPATIENT)
Dept: PHYSICAL THERAPY | Age: 49
End: 2024-05-28
Attending: ORTHOPAEDIC SURGERY

## 2024-05-28 ENCOUNTER — TELEPHONE (OUTPATIENT)
Dept: ORTHOPEDICS CLINIC | Facility: CLINIC | Age: 49
End: 2024-05-28

## 2024-05-28 DIAGNOSIS — S83.511A RUPTURE OF ANTERIOR CRUCIATE LIGAMENT OF RIGHT KNEE, INITIAL ENCOUNTER: Primary | ICD-10-CM

## 2024-05-28 DIAGNOSIS — S83.281A ACUTE LATERAL MENISCUS TEAR OF RIGHT KNEE, INITIAL ENCOUNTER: ICD-10-CM

## 2024-05-28 DIAGNOSIS — S83.211A BUCKET-HANDLE TEAR OF MEDIAL MENISCUS OF RIGHT KNEE AS CURRENT INJURY, INITIAL ENCOUNTER: ICD-10-CM

## 2024-05-28 PROCEDURE — 97110 THERAPEUTIC EXERCISES: CPT

## 2024-05-28 PROCEDURE — 97014 ELECTRIC STIMULATION THERAPY: CPT

## 2024-05-28 PROCEDURE — 97162 PT EVAL MOD COMPLEX 30 MIN: CPT

## 2024-05-28 NOTE — TELEPHONE ENCOUNTER
Date of Surgery: 24       Post Op Appt:      Case ID:     Notes:       OR BOOKING SHEET KNEE  Location: [] [facility]                    [x] [facility]   Name: Magnolia Joseph  MRN: [id number]   : 1975  Diagnosis:  [x] Rupture of anterior cruciate ligament of right knee, initial encounter [S83.568A]  Disposition:    [x] Ambulatory  [] Overnight for LESLYE  [] Overnight for observation and pain control  [] Inpatient procedure     Operative Time Required: 2.5 hours  Procedure:  Antibiotics: 2 g cefazolin within 60 minutes of surgical incision (3 g if > 120 kg)  Laterality:                  [x] RIGHT KNEE        [] LEFT KNEE                      [] BILATERAL  Procedures:                    [x] Arthroscopy                                                      [x] ACL Reconstruction (32745)                                      [x] BTB autograft                  [] Quad autograft             [] Hamstring autograft                       [] Partial Medial Meniscectomy (70737)                    [x] Medial Meniscus Repair (31695)                    [] Partial Lateral Meniscectomy (95802)                    [x] Lateral Meniscus Repair (41732)                                                          [x] Synovectomy (21413)     Additional info:   [] PCP Clearance Needed  [] Mini C - arm  [x] TXA at Time of Surgery  [x] Physical Therapy Internal [facility]  [x] DME Rx Needed  [] Appt with Dr. Nugent needed  Implants needed: Arthrex / [employer]  Positioning Equipment: Supine with Lateral Post

## 2024-05-28 NOTE — PROGRESS NOTES
LOWER EXTREMITY EVALUATION:     Diagnosis:   Rupture of anterior cruciate ligament of right knee, initial encounter (S83.511A)  Bucket-handle tear of medial meniscus of right knee as current injury, initial encounter (S83.211A)  Acute lateral meniscus tear of right knee, initial encounter (S83.281A)         Referring Provider: Shaji Nugent  Date of Evaluation:    5/28/2024    Precautions:   ACL deficiency and Med/Lat meniscus tears Next MD visit:   none scheduled  Date of Surgery: scheduled for 6/6/24     PATIENT SUMMARY   Magnolia Joseph is a 49 year old male who presents to therapy today with complaints of right knee pain.  He has history of right knee pain and LE weakness and he did some PT in the fall of 2023 with some gains in his strenght. He had a previously diagnosed ACL tear.  After DC from therapy, he continued some of the exercises he had learned and he returned to playing soccer. NICOLAS chakraborty was playing soccer a month ago and twisted his knee while kicking causing a significant increase in knee pain> He described pain, swelling, locking, instability and an inability to extend the knee.  He followed up in the ER and then also with Dr Nugent and was recommended surgical management; he is not sure if he will do the surgery at this time due to financial reasons as he will need time off work. His MRI demonstrated a bucket handle tear of the medial meniscus, lateral meniscus tear and ACL rupture. He locates his pain at the anterior and medial knee; this is a sharp pain. He notices a lot of restriction and pain with straightening the knee.  He is using some pain medication that he was  given at the ER and then the gel ice pack. HE does feel that the knee is unstable but denies any falls.  He does plan on ACL reconstruction  some point    Pt describes pain level current 0/10, at best 0/10, at worst 10/10.   Current functional limitations include limited with gait endurance, compensating with gait causing back pain,  limited with bending and straightening, stairs up and down, squatting, sit to stand, and pain with prolonged standing    Magnolia describes prior level of function Able to perform all ADLS pain free and exercise with minimal symptoms. Pt goals include decrease pain, straighten knee and return to full activities.  Past medical history was reviewed with Magnolia. Significant findings include None besides current knee injury    ASSESSMENT  Magnolia presents to physical therapy evaluation with primary c/o right knee pain. The results of the objective tests and measures show decrease knee extension and flexion ranges, pain, edema and strength deficits which are limiting his performance of all ADLS and causing him to walk with an antalgic gait pattern.  Functional deficits include but are not limited to limited knee ext, pain with waling, antalgic gait pattern, squatting and stairs.  Signs and symptoms are consistent with diagnosis of ACL rupture, medial meniscus tear and lateral meniscus tear. Pt and PT discussed evaluation findings, pathology, POC and HEP.  Pt voiced understanding and performs HEP correctly without reported pain. Skilled Physical Therapy is medically necessary to address the above impairments and reach functional goals.     OBJECTIVE:   Observation: Patient stands with increased right knee flexion, decreased RLE weight bearing, right high iliac crest, left lateral lean and UE support  Palpation: Tender diffusing at the anterior and medial knee   Sensation: No deficits to light touch in the bilateral LE      AROM: (* denotes performed with pain)  Hip/Foot/Ankle Knee   WFL bilaterally Flexion: R 130; L 140  Extension: R 8 deg flexion; L 1 deg hyperext         Accessory motion: decreased patellar mobs M/L and S/i  Girth: R 42. 0 cm, L 40.2 cm  Flexibility:  Hamstrings: R -25; L -20      Strength/MMT: (* denotes performed with pain)  Hip Knee Foot/Ankle   Flexion: R 4/5; L 5/5  Extension: R 3/5; L  3+/5  Abduction: R 4/5; L 5/5  ER: R 4+/5; L 5/5  IR: R 4+/5; L 5/5 Flexion: R 4-/5; L 5/5  Extension: R 3-/5; L 5/5    DF: R 5/5; L 5/5  PF: R 5/5; L 5/5       Special tests:   Varus Stress Test: R neg, L neg  Valgus Stress Test: R neg, L neg  Lachman Test: R pos, L neg  Posterior Drawer Test: R neg, L neg  Miracle's Test: R pos, L neg      Gait: pt ambulates on level ground with antalgia and decreased step length right, decreased right push off, left lateral lean  Balance: SLS: R 6 with pain sec, L 24 sec    Today’s Treatment and Response:   Pt education was provided on exam findings, treatment diagnosis, treatment plan, expectations, and prognosis. Pt was also provided recommendations for activity modifications, possible soreness after evaluation, modalities as needed [ice/heat], importance of remaining active, and post op progression .  Patient was instructed in and issued a HEP for: quad set, knee ext stretch, prone hang and icing    Charges: PT Eval Moderate Complexity, 1 ther ex, 1 UNATT E stim      Total Timed Treatment: 22 min     Total Treatment Time: 45 min Interventions performed at the initial evaluation: Patellar mobs to increase ROM grade III into all planes, PROM grade II-III to increase knee extension (3 deg increase) and supine knee ext stretch x 2 min, quad set x 10, 5\" and prone hang x 1 min. IFC to the right knee x 10 min (cont, 12 V)      Based on clinical rationale and outcome measures, this evaluation involved Moderate Complexity decision making due to 1-2 personal factors/comorbidities, 3 body structures involved/activity limitations, and evolving symptoms including changing pain levels.  PLAN OF CARE:    Goals: (to be met in 8 visits)  1. Patient will be independent in a progressive HEP to help manage symptoms and achieve functional independence in 3 sessions.  2. Patient will decrease his max pain to 4/10 as needed to improve his functional independence in 3 weeks.  3. Patient ill  increase his knee ext to 0 deg to help normalize his gait pattern for heel strike  4. Patient will increase his quad strength to 4-/5 as needed to improve his endurance and mechanics with gait  5. Patient will increase his hip extension MMT to 4/5 as needed to improve his strenght to support the knee with squatting to perform daily chores and cares  6. Patient will increase his knee flexion to 135 deg or greater to decrease knee stiffness to improve mechanics and decrease compensations with stairs and transfers    Frequency / Duration: Patient will be seen for 1-2 x/week or a total of 8 visits over a 90 day period. Treatment will include: Gait training, Manual Therapy, Neuromuscular Re-education, Self-Care Home Management, Therapeutic Activities, Therapeutic Exercise, Home Exercise Program instruction, and Modalities to include: Electrical stimulation (unattended)    Education or treatment limitation: Communication  Rehab Potential:fair    LEFS Score  No data recorded    Patient/Family/Caregiver was advised of these findings, precautions, and treatment options and has agreed to actively participate in planning and for this course of care.    Thank you for your referral. Please co-sign or sign and return this letter via fax as soon as possible to [phone]. If you have any questions, please contact me at Dept: [phone]    Sincerely,  Electronically signed by therapist: Marguerite Johns PT  Physician's certification required: Yes  I certify the need for these services furnished under this plan of treatment and while under my care.    X___________________________________________________ Date____________________    Certification From: 5/28/2024  To:8/26/2024

## 2024-05-29 NOTE — TELEPHONE ENCOUNTER
SPOKE WITH PATIENT AND HE SAID HE WILL CALL US BACK TO SCHEDULE SURGERY AND TO CANCEL FOR NOW    SURGERY CANCELED

## 2024-06-04 ENCOUNTER — OFFICE VISIT (OUTPATIENT)
Dept: PHYSICAL THERAPY | Age: 49
End: 2024-06-04
Attending: ORTHOPAEDIC SURGERY
Payer: COMMERCIAL

## 2024-06-04 PROCEDURE — 97014 ELECTRIC STIMULATION THERAPY: CPT

## 2024-06-04 PROCEDURE — 97110 THERAPEUTIC EXERCISES: CPT

## 2024-06-04 NOTE — PROGRESS NOTES
Diagnosis:   Rupture of anterior cruciate ligament of right knee, initial encounter (S83.511A)  Bucket-handle tear of medial meniscus of right knee as current injury, initial encounter (S83.211A)  Acute lateral meniscus tear of right knee, initial encounter (S83.281A)            Referring Provider: Shaji Nugent  Date of Evaluation:    5/28/24    Precautions:  ACL deficiency and Med/Lat meniscus tears Next MD visit:   none scheduled  Date of Surgery: n/a   Insurance Primary/Secondary: [employer] [place]MO / N/A     # Auth Visits: 5 visits auth             Subjective: Patient reports that he is having less of the locking and he can get the knee straighter.  He locates more of the pain at the inside of the knee.  He does not have pain at rest but with movement he will have increased pain up to a 5/10.  Patient is now using a can about 20% of the time.  He is also using the knee brace/sleeve about 20% of the time. He is not planning on having his surgery this week and think he will wait to do it in the winter due to work.    Pain: 0/10      Objective: See treatment log      AROM: (* denotes performed with pain)  Knee   Flexion: R 138 (130); L 140  Extension: R 4 deg flexion (8 deg flex); L 1 deg hyperext             Assessment: Focused this session on extension range of motion and quad recruitment to help promote improved gait mechanics and knee stability. He did have minimal gains into extension this session s the initial evaluation. Need to continue with progressive strengthening and flexibility exercises to improve his mechanics and allow him to safely walk without an antalgic gait pattern.       Goals:   Goals: (to be met in 8 visits)  1. Patient will be independent in a progressive HEP to help manage symptoms and achieve functional independence in 3 sessions.  2. Patient will decrease his max pain to 4/10 as needed to improve his functional independence in 3 weeks.  3. Patient ill increase his knee ext to 0 deg to help  normalize his gait pattern for heel strike  4. Patient will increase his quad strength to 4-/5 as needed to improve his endurance and mechanics with gait  5. Patient will increase his hip extension MMT to 4/5 as needed to improve his strenght to support the knee with squatting to perform daily chores and cares  6. Patient will increase his knee flexion to 135 deg or greater to decrease knee stiffness to improve mechanics and decrease compensations with stairs and transfers    Plan: Continue with progressive quad and LE strengthening with a focus on knee extension; progress with SL shuttle, step ups and standing mini squats as tolerated.   Date: 6/4/2024  TX#: 2/5 Date:                 TX#: 3/ Date:                 TX#: 4/ Date:                 TX#: 5/   Ther ex:  Prone hang x 2 min  Prone TKE x 15, 5\"     Patellar mobs M/L and S/I grade III x 3 min  Supine extension on 1/2 foam stretch x 2 min  DKSA Calf stretch 2 x 30\"  DKSA HS stretch 2 x 30\"  Quad set x 10, 5\"  SLR 2 x 10  SL hip abduction 2 x 10   Prone hip ext 2 x 10  SL hip adduction 1 x 10     Heel slides: x 20 with   PROM knee flexion x 15 reps     Shuttle DLP x 20, 6 cord  NuStep for ROM level 4 with arms      Modalities:  IFC to the right knee , cont 14 V x 10 min                  HEP: IE: quad set, knee ext stretch, prone hang and icing  6/4 SLR, SL hip abduction/adduction, prone ext    Charges: 3 ther ex (40 min) 1 Unatt Estim 11 min)       Total Timed Treatment: 40 min  Total Treatment Time: 53 min      Certification From: 5/28/2024  To:8/26/2024

## 2024-06-06 ENCOUNTER — OFFICE VISIT (OUTPATIENT)
Dept: PHYSICAL THERAPY | Age: 49
End: 2024-06-06
Payer: COMMERCIAL

## 2024-06-06 PROCEDURE — 97110 THERAPEUTIC EXERCISES: CPT

## 2024-06-06 PROCEDURE — 97014 ELECTRIC STIMULATION THERAPY: CPT

## 2024-06-06 NOTE — PROGRESS NOTES
Diagnosis:   Rupture of anterior cruciate ligament of right knee, initial encounter (S83.511A)  Bucket-handle tear of medial meniscus of right knee as current injury, initial encounter (S83.211A)  Acute lateral meniscus tear of right knee, initial encounter (S83.281A)            Referring Provider: Shaji Nugent  Date of Evaluation:    5/28/24    Precautions:  ACL deficiency and Med/Lat meniscus tears Next MD visit:   none scheduled  Date of Surgery: n/a   Insurance Primary/Secondary: [employer] [place] / N/A     # Auth Visits: 5 visits auth             Subjective: Patient reports that he doesn't have pain at rest, but with walking, carrying something heavy or moving quickly there will be some pain in the knee.  He has not used the cane since the last session just the knee sleeve.    Pain: 0/10      Objective: See treatment log      AROM: (* denotes performed with pain)  Knee   Flexion: R 138 (138); L 140  Extension: R 2 deg flexion (4 deg flex); L 1 deg hyperext             Assessment:  Patient had better performance of the prone TKE this session with better quad recruitment vs compensations with the hips. He had better stance time on the right this visit likely due to better quad recruitment as he has better end range extension. Progressed strengthening to help improve his dynamic knee stability and allow him improve gait mechanics and safety with walking all distances; needs reinforcement.     Goals:   Goals: (to be met in 8 visits)  1. Patient will be independent in a progressive HEP to help manage symptoms and achieve functional independence in 3 sessions.  2. Patient will decrease his max pain to 4/10 as needed to improve his functional independence in 3 weeks.  3. Patient ill increase his knee ext to 0 deg to help normalize his gait pattern for heel strike  4. Patient will increase his quad strength to 4-/5 as needed to improve his endurance and mechanics with gait  5. Patient will increase his hip extension MMT to  4/5 as needed to improve his strenght to support the knee with squatting to perform daily chores and cares  6. Patient will increase his knee flexion to 135 deg or greater to decrease knee stiffness to improve mechanics and decrease compensations with stairs and transfers    Plan: Continue with progressive quad and LE strengthening with a focus on knee extension; progress with step ups and lateral walks as tolerated.   Date: 6/4/2024  TX#: 2/5 Date:   6/6/24              TX#: 3/5 Date:                 TX#: 4/ Date:                 TX#: 5/   Ther ex:  Prone hang x 2 min  Prone TKE x 15, 5\"     Patellar mobs M/L and S/I grade III x 3 min  Supine extension on 1/2 foam stretch x 2 min  DKSA Calf stretch 2 x 30\"  DKSA HS stretch 2 x 30\"  Quad set x 10, 5\"  SLR 2 x 10  SL hip abduction 2 x 10   Prone hip ext 2 x 10  SL hip adduction 1 x 10     Heel slides: x 20 with   PROM knee flexion x 15 reps     Shuttle DLP x 20, 6 cord  NuStep for ROM level 4 with arms Ther ex:  NuStep for ROM x 6 min, level 5 with arms   Prone hang x 2 min  Prone TKE x 15, 5\"     Patellar mobs M/L and S/I grade III x 3 min  Supine extension on 1/2 foam stretch x 2 min  DKSA Calf stretch 2 x 30\"  DKSA HS stretch 2 x 30\"  Quad set x 10, 5\"  SLR 2 x 10  SL hip abduction 2 x 10   Prone hip ext 2 x 10  SL hip adduction  2 x 10     Heel slides: x 20 with   PROM knee flexion x 15 reps     Shuttle DLP x 20, 6 cord  Shuttle SLP x 20 3 cord  BB center balance x  2 min  Standing heel raises x 20  Slant stretch x 1 min level 2       Modalities:  IFC to the right knee , cont 14 V x 10 min Modalities:  IFC to the right knee , cont 17 V x 10 min                 HEP: IE: quad set, knee ext stretch, prone hang and icing  6/4 SLR, SL hip abduction/adduction, prone ext    Charges: 3 ther ex (42 min) 1 Unatt Estim 11 min)       Total Timed Treatment: 42 min  Total Treatment Time: 53 min      Certification From: 5/28/2024  To:8/26/2024

## 2024-06-10 ENCOUNTER — OFFICE VISIT (OUTPATIENT)
Dept: PHYSICAL THERAPY | Age: 49
End: 2024-06-10
Attending: ORTHOPAEDIC SURGERY
Payer: COMMERCIAL

## 2024-06-10 PROCEDURE — 97110 THERAPEUTIC EXERCISES: CPT

## 2024-06-10 PROCEDURE — 97014 ELECTRIC STIMULATION THERAPY: CPT

## 2024-06-10 NOTE — PROGRESS NOTES
Diagnosis:   Rupture of anterior cruciate ligament of right knee, initial encounter (S83.511A)  Bucket-handle tear of medial meniscus of right knee as current injury, initial encounter (S83.211A)  Acute lateral meniscus tear of right knee, initial encounter (S83.281A)            Referring Provider: Shaji Nugent  Date of Evaluation:    5/28/24    Precautions:  ACL deficiency and Med/Lat meniscus tears Next MD visit:   none scheduled  Date of Surgery: n/a   Insurance Primary/Secondary: [employer] [place] / N/A     # Auth Visits: 5 visits auth             Subjective: Patient reports that his knee is feeling much better and he is having much less pain in the knee. He has not had much buckling or clicking in the knee either since the last session.     Pain: 0/10      Objective: See treatment log      AROM: (* denotes performed with pain)  Knee   Flexion: R 142 (138); L 140  Extension: R 1 deg flexion (2 deg flex); L 1 deg hyperext             Assessment: Patient had better range of motion this session into both flexion and extension. Continued with strengthening progression with increased resistance on the shuttle to promote increased range of motion and strength for better gait mechanics and functional knee stability and improve mechanics and subjective feelings of instability He does have increased pain with strengthening exercises on the shuttle up to a 5/10 due to instability in the knee; however, need to progress the strength to provide better support due to ligamentous and meniscal injuries..    Goals:   Goals: (to be met in 8 visits)  1. Patient will be independent in a progressive HEP to help manage symptoms and achieve functional independence in 3 sessions.  2. Patient will decrease his max pain to 4/10 as needed to improve his functional independence in 3 weeks.  3. Patient ill increase his knee ext to 0 deg to help normalize his gait pattern for heel strike  4. Patient will increase his quad strength to 4-/5 as  needed to improve his endurance and mechanics with gait  5. Patient will increase his hip extension MMT to 4/5 as needed to improve his strenght to support the knee with squatting to perform daily chores and cares  6. Patient will increase his knee flexion to 135 deg or greater to decrease knee stiffness to improve mechanics and decrease compensations with stairs and transfers    Plan: Continue with progressive quad and LE strengthening with a focus on knee extension for better stability; complete a reassessment this session.  Date:   6/6/24              TX#: 3/5 Date:     6/10/24            TX#: 4/5 Date:                 TX#: 5/   Ther ex:  NuStep for ROM x 6 min, level 5 with arms   Prone hang x 2 min  Prone TKE x 15, 5\"     Patellar mobs M/L and S/I grade III x 3 min  Supine extension on 1/2 foam stretch x 2 min  DKSA Calf stretch 2 x 30\"  DKSA HS stretch 2 x 30\"  Quad set x 10, 5\"  SLR 2 x 10  SL hip abduction 2 x 10   Prone hip ext 2 x 10  SL hip adduction  2 x 10     Heel slides: x 20 with   PROM knee flexion x 15 reps     Shuttle DLP x 20, 6 cord  Shuttle SLP x 20 3 cord  BB center balance x  2 min  Standing heel raises x 20  Slant stretch x 1 min level 2   Ther ex:  NuStep for ROM x 6 min, level 5 with arms   Prone hang x 2 min  Prone TKE x 15, 5\"     Patellar mobs M/L and S/I grade III x 3 min  Supine extension on 1/2 foam stretch x 2 min  DKSA Calf stretch 1 x 30\"  DKSA HS stretch 2 x 30\"  Quad set x 10, 5\"  SLR 2 x 10  SL hip abduction 2 x 10   Prone hip ext 2 x 10  SL hip adduction  2 x 10       PROM knee flexion x 15 reps     Shuttle DLP x 20, 7 cord  Shuttle SLP x 20 4 cord  BB center balance x  2 min  Standing heel raises x 20  Slant stretch x 1 min level 2      Modalities:  IFC to the right knee , cont 17 V x 10 min Modalities:  IFC to the right knee , cont 17 V x 10 min              HEP: IE: quad set, knee ext stretch, prone hang and icing  6/4 SLR, SL hip abduction/adduction, prone ext  6/10 lat  walks     Charges: 3 ther ex (41 min) 1 Unatt Estim (11 min)       Total Timed Treatment: 41 min  Total Treatment Time: 53 min      Certification From: 5/28/2024  To:8/26/2024

## 2024-06-18 ENCOUNTER — OFFICE VISIT (OUTPATIENT)
Dept: PHYSICAL THERAPY | Age: 49
End: 2024-06-18
Payer: COMMERCIAL

## 2024-06-18 PROCEDURE — 97110 THERAPEUTIC EXERCISES: CPT

## 2024-06-18 PROCEDURE — 97014 ELECTRIC STIMULATION THERAPY: CPT

## 2024-06-18 NOTE — PROGRESS NOTES
Progress Summary  Pt has attended 5 visits in Physical Therapy.       Diagnosis:   Rupture of anterior cruciate ligament of right knee, initial encounter (S83.511A)  Bucket-handle tear of medial meniscus of right knee as current injury, initial encounter (S83.211A)  Acute lateral meniscus tear of right knee, initial encounter (S83.281A)            Referring Provider: Shaji Nugent  Date of Evaluation:    5/28/24    Precautions:  ACL deficiency and Med/Lat meniscus tears Next MD visit:   none scheduled  Date of Surgery: n/a   Insurance Primary/Secondary: [employer] [place] / N/A     # Auth Visits: 5 visits auth             Subjective: Patient reports that overall the knee is feeling much better overall with the knee.  He has less pain and he feels that the mobility is better allowing him better functional mobility.  He is not currently having any pain in the knee at rest. He does have some pain in the knee with sit to stand, initiating walking and he does still feel some instability in the his knee. He doesn't feel that he can completely trust the knee at this time so he has to be careful with walking, stairs and transfers.  He is still having some restrictions with sit to stand, squatting, stairs and gait compensations. He does feel that his pain is improved so he can work more. He is planning on having surgery in a couple of months when his work schedule is not as busy.  Pain: 0/10; MAX 4/10  Current functional limitations include limited with gait endurance, altered gait, stairs up and down, squatting, sit to stand    Objective: See treatment log      AROM: (* denotes performed with pain)  Knee   Flexion: R 140; L 140  Extension: R 1 deg flexion; L 1 deg hyperext         Observation: Patient stands with increased right knee flexion, decreased RLE weight bearing, right high iliac crest, left lateral lean and UE support  Palpation: Tender at the medial knee   Sensation: No deficits to light touch in the bilateral  LE      Accessory motion: decreased patellar mobs  S/I  Girth: R minimal at the joint line   Flexibility:  Hamstrings: R -15; L -20      Strength/MMT: (* denotes performed with pain)  Hip Knee Foot/Ankle   Flexion: R 4+/5; L 5/5  Extension: R 3+/5; L 3+/5  Abduction: R 4+/5; L 5/5  ER: R 4+/5; L 5/5  IR: R 4+/5; L 5/5  Glut med: R 3+/5  L 4/5 Flexion: R 4/5; L 5/5  Extension: R 4-/5; L 5/5    DF: R 5/5; L 5/5  PF: R 5/5; L 5/5       Special tests:   Varus Stress Test: R neg, L neg  Valgus Stress Test: R neg, L neg  Lachman Test: R pos, L neg  Posterior Drawer Test: R neg, L neg  Miracle's Test: R min pos, L neg      Gait: pt ambulates on level ground with antalgia with min decreased right stance time, decreased right terminal knee extension and  decreased right push off    Balance: SLS: R 20 sec, L 24 sec    Assessment: Patient has made good gains in his strenght and flexibility since beginning physical therapy allowing him better gait mechanics and force generation for gait and transfers.  HE continue to have hip and quad weakness along with proprioceptive deficits which cause him to walk with an antalgic gait pattern and compensate during weight bearing ADLS.  He would benefit from additional skilled physical therapy to improve his strength, balance and flexibility to improve his gait mechanics, decrease his risk for falls and improve his baseline strenght and range of motion pre-operatively to improve his surgical outcomes.     Goals:   Goals: (to be met in 8 visits)  1. Patient will be independent in a progressive HEP to help manage symptoms and achieve functional independence in 3 sessions.MET  2. Patient will decrease his max pain to 4/10 as needed to improve his functional independence in 3 weeks. MET  3. Patient ill increase his knee ext to 0 deg to help normalize his gait pattern for heel strike WORKING TOWARD   4. Patient will increase his quad strength to 4-/5 as needed to improve his endurance and  mechanics with gait PARTIALLY MET  5. Patient will increase his hip extension MMT to 4/5 as needed to improve his strenght to support the knee with squatting to perform daily chores and cares WORKING TOWARD   6. Patient will increase his knee flexion to 135 deg or greater to decrease knee stiffness to improve mechanics and decrease compensations with stairs and transfers MET          Plan: Continue skilled Physical Therapy 1-2 x/week or a total of 8 visits over a 90 day period. Treatment will include: manual therapy, range of motion exercises, flexibility exercises, strengthening exercises, postural re-ed, neuromuscular re-education, CKC exercises, balance activities, and HEP instruction all per pre-op protocol to improve her functional independence         Patient/Family/Caregiver was advised of these findings, precautions, and treatment options and has agreed to actively participate in planning and for this course of care.    Thank you for your referral. If you have any questions, please contact me at Dept: [phone].    Sincerely,  Electronically signed by therapist: Marguerite Johns PT     Physician's certification required:  Yes  Please co-sign or sign and return this letter via fax as soon as possible to [phone].   I certify the need for these services furnished under this plan of treatment and while under my care.    X___________________________________________________ Date____________________    Certification From: 6/18/2024  To:9/16/2024     Date:   6/6/24              TX#: 3/5 Date:     6/10/24            TX#: 4/5 Date:  6/18/24               TX#: 5/5   Ther ex:  NuStep for ROM x 6 min, level 5 with arms   Prone hang x 2 min  Prone TKE x 15, 5\"     Patellar mobs M/L and S/I grade III x 3 min  Supine extension on 1/2 foam stretch x 2 min  DKSA Calf stretch 2 x 30\"  DKSA HS stretch 2 x 30\"  Quad set x 10, 5\"  SLR 2 x 10  SL hip abduction 2 x 10   Prone hip ext 2 x 10  SL hip adduction  2 x  10     Heel slides: x 20 with   PROM knee flexion x 15 reps     Shuttle DLP x 20, 6 cord  Shuttle SLP x 20 3 cord  BB center balance x  2 min  Standing heel raises x 20  Slant stretch x 1 min level 2   Ther ex:  NuStep for ROM x 6 min, level 5 with arms   Prone hang x 2 min  Prone TKE x 15, 5\"     Patellar mobs M/L and S/I grade III x 3 min  Supine extension on 1/2 foam stretch x 2 min  DKSA Calf stretch 1 x 30\"  DKSA HS stretch 2 x 30\"  Quad set x 10, 5\"  SLR 2 x 10  SL hip abduction 2 x 10   Prone hip ext 2 x 10  SL hip adduction  2 x 10       PROM knee flexion x 15 reps     Shuttle DLP x 20, 7 cord  Shuttle SLP x 20 4 cord  BB center balance x  2 min  Standing heel raises x 20  Slant stretch x 1 min level 2   Ther ex:  NuStep for ROM x 6 min, level 5 with arms   Prone hang x 2 min      Patellar mobs M/L and S/I grade III x 3 min    DKSA Calf stretch 1 x 30\" ND  DKSA HS stretch 2 x 30\" ND  Quad set x 10, 5\"  SLR 2 x 10  SLR  with ER 2 x 10  SL hip abduction 2 x 10  ND  Prone hip ext 2 x 10 ND  SL hip adduction  2 x 10 ND      PROM knee ext/flexion x 15 reps     Shuttle DLP x 20, 8 cord  Shuttle SLP x 20 5 cord  BB center balance x  2 min  Standing heel raises x 20  Slant stretch x 1 min level 2   Modalities:  IFC to the right knee , cont 17 V x 10 min Modalities:  IFC to the right knee , cont 17 V x 10 min Modalities:  IFC to the right knee , cont 22 V x 10 min             HEP: IE: quad set, knee ext stretch, prone hang and icing  6/4 SLR, SL hip abduction/adduction, prone ext  6/10 lat walks     Charges: 3 ther ex (38 min) 1 Unatt Estim (11 min)       Total Timed Treatment: 49 min  Total Treatment Time: 50 min      Certification From: 5/28/2024  To:8/26/2024

## 2024-06-20 ENCOUNTER — APPOINTMENT (OUTPATIENT)
Dept: PHYSICAL THERAPY | Age: 49
End: 2024-06-20
Payer: COMMERCIAL

## 2024-06-25 ENCOUNTER — APPOINTMENT (OUTPATIENT)
Dept: PHYSICAL THERAPY | Age: 49
End: 2024-06-25
Payer: COMMERCIAL

## 2024-07-03 ENCOUNTER — APPOINTMENT (OUTPATIENT)
Dept: PHYSICAL THERAPY | Age: 49
End: 2024-07-03
Payer: COMMERCIAL

## 2024-07-09 ENCOUNTER — TELEPHONE (OUTPATIENT)
Dept: PHYSICAL THERAPY | Facility: HOSPITAL | Age: 49
End: 2024-07-09

## 2024-07-12 ENCOUNTER — APPOINTMENT (OUTPATIENT)
Dept: PHYSICAL THERAPY | Age: 49
End: 2024-07-12
Payer: COMMERCIAL

## 2024-07-16 ENCOUNTER — APPOINTMENT (OUTPATIENT)
Dept: PHYSICAL THERAPY | Age: 49
End: 2024-07-16
Payer: COMMERCIAL

## 2024-07-23 ENCOUNTER — APPOINTMENT (OUTPATIENT)
Dept: PHYSICAL THERAPY | Age: 49
End: 2024-07-23
Payer: COMMERCIAL

## 2024-07-25 ENCOUNTER — APPOINTMENT (OUTPATIENT)
Dept: PHYSICAL THERAPY | Age: 49
End: 2024-07-25
Payer: COMMERCIAL

## 2024-07-26 ENCOUNTER — OFFICE VISIT (OUTPATIENT)
Dept: PHYSICAL THERAPY | Age: 49
End: 2024-07-26
Attending: ORTHOPAEDIC SURGERY
Payer: COMMERCIAL

## 2024-07-26 PROCEDURE — 97110 THERAPEUTIC EXERCISES: CPT

## 2024-07-26 PROCEDURE — 97014 ELECTRIC STIMULATION THERAPY: CPT

## 2024-07-26 NOTE — PROGRESS NOTES
Diagnosis:   Rupture of anterior cruciate ligament of right knee, initial encounter (S83.511A)  Bucket-handle tear of medial meniscus of right knee as current injury, initial encounter (S83.211A)  Acute lateral meniscus tear of right knee, initial encounter (S83.281A)            Referring Provider: Shaji Nugent  Date of Evaluation:    5/28/24    Precautions:  ACL deficiency and Med/Lat meniscus tears Next MD visit:   none scheduled  Date of Surgery: n/a   Insurance Primary/Secondary: [employer] [place] HMO / N/A     # Auth Visits: 5 additional visits auth until 8/24/24          Subjective: Patient reports sit to stand transfers are still challenging for him. He states that after 5-6 hours of sitting, feels his knee locks when he straightens it. Denies buckling or clicking/popping of knee. Denied knee pain today.    Pain: 0/10  Current functional limitations include limited with gait endurance, altered gait, stairs up and down, squatting, sit to stand    Objective: See treatment log  7/26/24: R knee AROM: flex: R 140 deg, ext: 0 deg      Assessment: Patient continued with quad and hip strengthening to promote functional LE strength in weightbearing positions and control knee stresses. He was able to tolerate exercises well but did have increased fatigue with balance and standing ther-ex. Pt will continue to benefit from PT to improve overall LE strength and balance needed for sit to stand transfers and squatting.    Goals:   Goals: (to be met in 8 visits)  1. Patient will be independent in a progressive HEP to help manage symptoms and achieve functional independence in 3 sessions.MET  2. Patient will decrease his max pain to 4/10 as needed to improve his functional independence in 3 weeks. MET  3. Patient ill increase his knee ext to 0 deg to help normalize his gait pattern for heel strike WORKING TOWARD   4. Patient will increase his quad strength to 4-/5 as needed to improve his endurance and mechanics with gait PARTIALLY  MET  5. Patient will increase his hip extension MMT to 4/5 as needed to improve his strenght to support the knee with squatting to perform daily chores and cares WORKING TOWARD   6. Patient will increase his knee flexion to 135 deg or greater to decrease knee stiffness to improve mechanics and decrease compensations with stairs and transfers MET      Plan: Continue with LE strengthening and balance to improve tolerance with sit to stand transfers; progress CKC strength with additional balance and lateral step downs as tolerated.  Date:  6/18/24               TX#: 5/5 Date: 7/26/24  TX#: 6/10   Ther ex:  NuStep for ROM x 6 min, level 5 with arms   Prone hang x 2 min      Patellar mobs BM/CL and S/I grade III x 3 min    DKSA Calf stretch 1 x 30\" ND  DKSA HS stretch 2 x 30\" ND  Quad set x 10, 5\"  SLR 2 x 10  SLR  with ER 2 x 10  SL hip abduction 2 x 10  ND  Prone hip ext 2 x 10 ND  SL hip adduction  2 x 10 ND      PROM knee ext/flexion x 15 reps     Shuttle DLP x 20, 8 cord  Shuttle SLP x 20 5 cord  BB center balance x  2 min  Standing heel raises x 20  Slant stretch x 1 min level 2 Ther ex:   NuStep for ROM x 6 min, level 5 with arms  DKSA HS stretch 2 x 30\"   DKSA calf stretch 3 x 30\"  SLR 3# 2x10  SLR ER 2# 2 x10  SL hip abductions 2# 2x10  Heel raises on floor x 20  Heel raises on stair step x 20  Lateral step up 6\" 2x10  Forward step ups 6\" 2x10  Lateral walks blue TB x 3 laps  BB med/lat 3 x 30 sec  BB A/P 3 x 30 sec  Shuttle SL press 5 cords 2x15  SLS Airex 2 x 30 sec  Curtsy lunge x 10 each side         Modalities:  IFC to the right knee , cont 22 V x 10 min Modalities:  IFC to the right knee , cont 19 V x 10 min           HEP: IE: quad set, knee ext stretch, prone hang and icing  6/4 SLR, SL hip abduction/adduction, prone ext  6/10 lat walks     Charges: 3 ther ex (39 min) 1 Unatt Estim (10 min)       Total Timed Treatment: 49 min  Total Treatment Time: 49 min      Certification From: 5/28/2024  To:8/26/2024

## 2024-07-29 ENCOUNTER — OFFICE VISIT (OUTPATIENT)
Dept: PHYSICAL THERAPY | Age: 49
End: 2024-07-29
Attending: ORTHOPAEDIC SURGERY
Payer: COMMERCIAL

## 2024-07-29 PROCEDURE — 97110 THERAPEUTIC EXERCISES: CPT

## 2024-07-29 PROCEDURE — 97014 ELECTRIC STIMULATION THERAPY: CPT

## 2024-07-29 NOTE — PROGRESS NOTES
Progress Summary  Pt has attended 7 visits in Physical Therapy.       Diagnosis:   Rupture of anterior cruciate ligament of right knee, initial encounter (S83.511A)  Bucket-handle tear of medial meniscus of right knee as current injury, initial encounter (S83.211A)  Acute lateral meniscus tear of right knee, initial encounter (S83.281A)            Referring Provider: Shaji Nugent  Date of Evaluation:    5/28/24    Precautions:  ACL deficiency and Med/Lat meniscus tears Next MD visit:   none scheduled  Date of Surgery: n/a   Insurance Primary/Secondary: [employer] [place] HMO / N/A     # Auth Visits: 5 additional visits auth until 8/24/24          Subjective: Patient reports that hs is not having a lot of pain in the knee. He still has some fear that the knee will be unstable, but he has not had an instances of instability.  He feels a lot more confident with his knee than he felt at the onset of therapy.  He is happy about the increased ROM he has had and he is more confident walking and with position changes. He has been able to return to work since his pain and mobility have improved.  He wants to continue with progressive strengthening and flexibility to help improve his knee support and prepare him for surgical intervention this fall.    Pain: 0/10  Current functional limitations include limited with gait endurance, altered gait, stairs up and down, squatting, transfers out of the car and prolonged sitting    Objective: See treatment log    Observation: Patient stands with increased bilateral femoral medial rotation level  iliac crest,symmetrical LE weight bearing  Palpation: Tender at the medial knee   Sensation: No deficits to light touch in the bilateral LE      AROM: (* denotes performed with pain)  Hip/Foot/Ankle Knee   WFL bilaterally Flexion: R 139; L 140  Extension: R 0 deg; L 1 deg hyperext         Accessory motion: WFL into all planes   Girth: R 41. 0 cm, L 40.2 cm  Flexibility:  Hamstrings: R -15; L  -20      Strength/MMT: (* denotes performed with pain)   Hip Knee Foot/Ankle   Flexion: R 4+/5; L 5/5  Extension: R 3+/5; L 4-/5  Abduction: R +/5; L 5/5  ER: R 4+/5; L 5/5  IR: R 4+/5; L 5/5 Flexion: R 4+/5; L 5/5  Extension: R 4/5; L 5/5    DF: R 5/5; L 5/5  PF: R 5/5; L 5/5       Special tests:   Varus Stress Test: R neg, L neg  Valgus Stress Test: R neg, L neg  Lachman Test: R pos, L neg  Posterior Drawer Test: R neg, L neg  Miracle's Test: R pos, L neg      Gait: pt ambulates on level ground with min decreased step length right   Balance: SLS: R 12 sec, L > 30 sec all on an unstable surface     Assessment: Patient has made gain in his range of motion and strength since beginning physical therapy. He continues to have increased laxity and tenderness at the joint line due to ACL insufficiency and also meniscal tearing; however, his strenght and flexibility gains have improved his gait mechanics and decreased his instability during ADLS.  He would benefit from additional skilled physical therapy intervention to improve his strength, flexibility and proprioceptive control as needed to improve his gait, stairs and transfers and to maximize his post-operative gains due to planned surgery in the fall.    Goals:   Goals: (to be met in 8 visits)  1. Patient will be independent in a progressive HEP to help manage symptoms and achieve functional independence in 3 sessions.MET  2. Patient will decrease his max pain to 4/10 as needed to improve his functional independence in 3 weeks. MET  3. Patient ill increase his knee ext to 0 deg to help normalize his gait pattern for heel strike MET   4. Patient will increase his quad strength to 4-/5 as needed to improve his endurance and mechanics with gait  MET - increased to 4+/5  5. Patient will increase his hip extension MMT to 4/5 as needed to improve his strenght to support the knee with squatting to perform daily chores and cares WORKING TOWARD   6. Patient will increase his  knee flexion to 135 deg or greater to decrease knee stiffness to improve mechanics and decrease compensations with stairs and transfers MET  UPDATED:  7. Patient will demonstrate 10 lateral step downs on a 4 inch box to improve his eccentric stability for a decreased risk for falls during gait and transfers.  8. Patient will increase his SLB on an unstable surface to greater than 25 sec as needed to improve his dynamic stability for walking in the community       Plan: Continue skilled Physical Therapy 1-2 x/week or a total of 8 visits over a 90 day period. Patient attendance will be pending  work travel schedule Treatment will include: manual therapy, range of motion exercises, flexibility exercises, strengthening exercises, postural re-ed, neuromuscular re-education, CKC exercises, balance activities, stationary bike, and HEP instruction all per protocol to improve her functional independence         Patient/Family/Caregiver was advised of these findings, precautions, and treatment options and has agreed to actively participate in planning and for this course of care.    Thank you for your referral. If you have any questions, please contact me at Dept: [phone].    Sincerely,  Electronically signed by therapist: Marguerite Johns PT     Physician's certification required:  Yes  Please co-sign or sign and return this letter via fax as soon as possible to [phone].   I certify the need for these services furnished under this plan of treatment and while under my care.    X___________________________________________________ Date____________________    Certification From: 7/29/2024  To:10/27/2024       Date:  6/18/24               TX#: 5/5 Date: 7/26/24  TX#: 6/10 Date: 7/29/24  TX#: 7/10   Ther ex:  NuStep for ROM x 6 min, level 5 with arms   Prone hang x 2 min      Patellar mobs BM/CL and S/I grade III x 3 min    DKSA Calf stretch 1 x 30\" ND  DKSA HS stretch 2 x 30\" ND  Quad set x 10, 5\"  SLR 2 x  10  SLR  with ER 2 x 10  SL hip abduction 2 x 10  ND  Prone hip ext 2 x 10 ND  SL hip adduction  2 x 10 ND      PROM knee ext/flexion x 15 reps     Shuttle DLP x 20, 8 cord  Shuttle SLP x 20 5 cord  BB center balance x  2 min  Standing heel raises x 20  Slant stretch x 1 min level 2 Ther ex:   NuStep for ROM x 6 min, level 5 with arms  DKSA HS stretch 2 x 30\"   DKSA calf stretch 3 x 30\"  SLR 3# 2x10  SLR ER 2# 2 x10  SL hip abductions 2# 2x10  Heel raises on floor x 20  Heel raises on stair step x 20  Lateral step up 6\" 2x10  Forward step ups 6\" 2x10  Lateral walks blue TB x 3 laps  BB med/lat 3 x 30 sec  BB A/P 3 x 30 sec  Shuttle SL press 5 cords 2x15  SLS Airex 2 x 30 sec  Curtsy lunge x 10 each side       Ther ex:   NuStep for ROM x 6 min, level 56 with arms  DKSA HS stretch 2 x 30\"     SLR 3# 2x10  SLR ER 0# 1 x10  SL hip abductions 2# 2x10    Heel raises on stair step x 20  Lateral step up 6\"  x 15  Forward step ups 6\" x15   Lateral walks blue TB x 3 laps  BB med/lat 1 x 60 sec  BB A/P 1 x 60 sec  Shuttle SL press 5 cords 2x15  SLS Airex 2 x 30 sec  Retro alt lunge x 10 each side    Lateral step down 2 x 8,  3 inch box  Fwd step up on bosu x 12   Modalities:  IFC to the right knee , cont 22 V x 10 min Modalities:  IFC to the right knee , cont 19 V x 10 min Modalities:  IFC to the right knee , cont 23 V x 10 min             HEP: lateral step downs, leg raises and retro lunges     Charges: 3 ther ex (40 min) 1 Unatt Estim (10 min)       Total Timed Treatment: 40 min  Total Treatment Time: 52 min      Certification From: 5/28/2024  To:8/26/2024

## 2024-07-30 ENCOUNTER — APPOINTMENT (OUTPATIENT)
Dept: PHYSICAL THERAPY | Age: 49
End: 2024-07-30
Payer: COMMERCIAL

## 2024-08-01 ENCOUNTER — APPOINTMENT (OUTPATIENT)
Dept: PHYSICAL THERAPY | Age: 49
End: 2024-08-01
Payer: COMMERCIAL

## 2024-08-16 ENCOUNTER — OFFICE VISIT (OUTPATIENT)
Dept: PHYSICAL THERAPY | Age: 49
End: 2024-08-16
Attending: ORTHOPAEDIC SURGERY
Payer: COMMERCIAL

## 2024-08-16 PROCEDURE — 97110 THERAPEUTIC EXERCISES: CPT

## 2024-08-16 NOTE — PROGRESS NOTES
Progress Summary  Pt has attended 8 visits in Physical Therapy.     Diagnosis:   Rupture of anterior cruciate ligament of right knee, initial encounter (S83.511A)  Bucket-handle tear of medial meniscus of right knee as current injury, initial encounter (S83.211A)  Acute lateral meniscus tear of right knee, initial encounter (S83.281A)            Referring Provider: Shaji Nugent  Date of Evaluation:    5/28/24    Precautions:  ACL deficiency and Med/Lat meniscus tears Next MD visit:   none scheduled  Date of Surgery: n/a   Insurance Primary/Secondary: [employer] [place] / N/A     # Auth Visits: 5 additional visits auth until 8/24/24          Subjective: Patient reports that  his knee is feeling better and he is having less stiffness and restrictions in the knee. He has not had any buckling or giving way and denies all falls. . He still has some fear that the knee will be unstable..  He feels a lot more confident with his knee than he felt at the onset of therapy.  He is happy about the increased ROM he has had and he is more confident walking and with position changes. He has been able to return to work since his pain and mobility have improved. HE no longer feels like he is limping but he is limited with prolonged walking.  He wants to continue with progressive strengthening and flexibility to help improve his knee support and prepare him for surgical intervention this fall.    Pain: 0/10  Current functional limitations include limited with gait endurance, stairs  down, squatting, transfers out of the car and prolonged sitting    Objective: See treatment log      Observation: Patient stands with increased bilateral femoral medial rotation level  iliac crest,symmetrical LE weight bearing  Palpation: Tender at the medial knee   Sensation: No deficits to light touch in the bilateral LE      AROM: (* denotes performed with pain)  Hip/Foot/Ankle Knee   WFL bilaterally Flexion: R 139; L 140  Extension: R 0 deg; L 1 deg  hyperext         Accessory motion: WFL into all planes   Girth NA  Flexibility:  Hamstrings: R -15; L -18      Strength/MMT: (* denotes performed with pain)   Hip Knee Foot/Ankle   Flexion: R 4+/5; L 5/5  Extension: R 4-/5; L 4/5  Abduction: R 4+/5; L 5/5  ER: R 4+/5; L 5/5  IR: R 5/5; L 5/5 Flexion: R 4+/5; L 5/5  Extension: R 4/5; L 5/5    DF: R 5/5; L 5/5  PF: R 5/5; L 5/5       Special tests:   Varus Stress Test: R neg, L neg  Valgus Stress Test: R neg, L neg  Lachman Test: R pos, L neg  Posterior Drawer Test: R neg, L neg  Miracle's Test: R pos, L neg  Lat step down: R min hip drop, decreased ROM and minimal anterior knee pain     Gait: pt ambulates on level ground with min decreased step length right   Balance: SLS: R 18 sec, L > 30 sec all on an unstable surface     Assessment:  Progressed this session with balance and strengthening this session to improve his dyanmic control of the knee in weight bearing and with ADLS. He had difficulty with the progression to the lateral reaches this visit due to eccentric quad and hip weakness. Continued with flexibility and strength to help improve his dynamic knee stability and decrease his risk for falls or instability in the knee. He would benefit from additional skilled physical therapy intervention to improve his strength, flexibility and proprioceptive control as needed to improve his gait, stairs and transfers and to maximize his post-operative gains due to planned surgery in the fall.        Goals:   Goals: (to be met in 8 visits)  1. Patient will be independent in a progressive HEP to help manage symptoms and achieve functional independence in 3 sessions.MET  2. Patient will decrease his max pain to 4/10 as needed to improve his functional independence in 3 weeks. MET  3. Patient ill increase his knee ext to 0 deg to help normalize his gait pattern for heel strike MET   4. Patient will increase his quad strength to 4+/5 as needed to improve his endurance and  mechanics with gait  WORKING TOWARD   5. Patient will increase his hip extension MMT to 4/5 as needed to improve his strenght to support the knee with squatting to perform daily chores and cares WORKING TOWARD   6. Patient will increase his knee flexion to 135 deg or greater to decrease knee stiffness to improve mechanics and decrease compensations with stairs and transfers MET  7. Patient will demonstrate 10 lateral step downs on a 4 inch box to improve his eccentric stability for a decreased risk for falls during gait and transfers. WORKING TOWARD  8. Patient will increase his SLB on an unstable surface to greater than 25 sec as needed to improve his dynamic stability for walking in the community WORKING TOWARD           Plan: Continue skilled Physical Therapy 1 x/week or a total of 5 visits over a 90 day period prior to surgical intervention.  He will attend 1 x/week due to work travel.  Treatment will include: manual therapy, range of motion exercises, flexibility exercises, strengthening exercises, postural re-ed, neuromuscular re-education, CKC exercises, balance activities, stationary bike, and HEP instruction all per protocol to improve her functional independence    Patient/Family/Caregiver was advised of these findings, precautions, and treatment options and has agreed to actively participate in planning and for this course of care.    Thank you for your referral. If you have any questions, please contact me at Dept: [phone].    Sincerely,  Electronically signed by therapist: Marguerite Johns PT     Physician's certification required:  Yes  Please co-sign or sign and return this letter via fax as soon as possible to [phone].   I certify the need for these services furnished under this plan of treatment and while under my care.    X___________________________________________________ Date____________________    Certification From: 8/16/2024  To:11/14/2024           Date: 7/29/24  TX#:  7/10 Date: 8/16/24  TX#: 8/10   Ther ex:   NuStep for ROM x 6 min, level 56 with arms  DKSA HS stretch 2 x 30\"     SLR 3# 2x10  SLR ER 0# 1 x10  SL hip abductions 2# 2x10    Heel raises on stair step x 20  Lateral step up 6\"  x 15  Forward step ups 6\" x15   Lateral walks blue TB x 3 laps  BB med/lat 1 x 60 sec  BB A/P 1 x 60 sec  Shuttle SL press 5 cords 2x15  SLS Airex 2 x 30 sec  Retro alt lunge x 10 each side    Lateral step down 2 x 8,  3 inch box  Fwd step up on bosu x 12 Ther ex:   NuStep for ROM x 6 min, level 6 with arms      SLR 4# 2x10  SLR ER 4# 1 x10  SL hip abductions 4# 2x10    Heel raises on stair step x 20  Lateral step up 6\"  x 15  Forward step ups 6\" x15   Lateral walks blue TB x 3 laps  BB center balance x 2 min  Shuttle SL press 5 cords 2x15  SLS dynadisk  x 30 sec  Retro alt lunge x 10 each side    Lateral step down 2 x 10,  4 inch box  Lateral reaches 2 x 10   Lateral lunges x 15  Fwd step up on bosu x 15  Lat step up on bosu x 15    SL squat to and from the plinth 2  x 8     SL bridge x 15, 5\"  DKSA HS stretch 2 x 30\" R  DKSA quad stretch 2 x 30\" R  DKSA ITB stretch 2 x 30\" R     Modalities:  IFC to the right knee , cont 23 V x 10 min            HEP: lateral step downs, leg raises and retro lunges, SL bridge, lat walks    Charges: 3 ther ex (40 min)       Total Timed Treatment: 40 min  Total Treatment Time: 41 min

## 2024-08-30 ENCOUNTER — APPOINTMENT (OUTPATIENT)
Dept: PHYSICAL THERAPY | Age: 49
End: 2024-08-30
Attending: ORTHOPAEDIC SURGERY
Payer: COMMERCIAL

## 2024-08-30 NOTE — PROGRESS NOTES
Progress Summary  Pt has attended 8 visits in Physical Therapy.     Diagnosis:   Rupture of anterior cruciate ligament of right knee, initial encounter (S83.511A)  Bucket-handle tear of medial meniscus of right knee as current injury, initial encounter (S83.211A)  Acute lateral meniscus tear of right knee, initial encounter (S83.281A)            Referring Provider: Shaji Nugent  Date of Evaluation:    5/28/24    Precautions:  ACL deficiency and Med/Lat meniscus tears Next MD visit:   none scheduled  Date of Surgery: n/a   Insurance Primary/Secondary: [employer] [place] / N/A     # Auth Visits: 5 additional visits auth until 8/24/24          Subjective: Patient reports that  his knee is feeling better and he is having less stiffness and restrictions in the knee. He has not had any buckling or giving way and denies all falls. . He still has some fear that the knee will be unstable..  He feels a lot more confident with his knee than he felt at the onset of therapy.  He is happy about the increased ROM he has had and he is more confident walking and with position changes. He has been able to return to work since his pain and mobility have improved. HE no longer feels like he is limping but he is limited with prolonged walking.  He wants to continue with progressive strengthening and flexibility to help improve his knee support and prepare him for surgical intervention this fall.    Pain: 0/10  Current functional limitations include limited with gait endurance, stairs  down, squatting, transfers out of the car and prolonged sitting    Objective: See treatment log      Observation: Patient stands with increased bilateral femoral medial rotation level  iliac crest,symmetrical LE weight bearing  Palpation: Tender at the medial knee   Sensation: No deficits to light touch in the bilateral LE      AROM: (* denotes performed with pain)  Hip/Foot/Ankle Knee   WFL bilaterally Flexion: R 139; L 140  Extension: R 0 deg; L 1 deg  hyperext         Accessory motion: WFL into all planes   Girth NA  Flexibility:  Hamstrings: R -15; L -18      Strength/MMT: (* denotes performed with pain)   Hip Knee Foot/Ankle   Flexion: R 4+/5; L 5/5  Extension: R 4-/5; L 4/5  Abduction: R 4+/5; L 5/5  ER: R 4+/5; L 5/5  IR: R 5/5; L 5/5 Flexion: R 4+/5; L 5/5  Extension: R 4/5; L 5/5    DF: R 5/5; L 5/5  PF: R 5/5; L 5/5       Special tests:   Varus Stress Test: R neg, L neg  Valgus Stress Test: R neg, L neg  Lachman Test: R pos, L neg  Posterior Drawer Test: R neg, L neg  Miracle's Test: R pos, L neg  Lat step down: R min hip drop, decreased ROM and minimal anterior knee pain     Gait: pt ambulates on level ground with min decreased step length right   Balance: SLS: R 18 sec, L > 30 sec all on an unstable surface     Assessment:  Progressed this session with balance and strengthening this session to improve his dyanmic control of the knee in weight bearing and with ADLS. He had difficulty with the progression to the lateral reaches this visit due to eccentric quad and hip weakness. Continued with flexibility and strength to help improve his dynamic knee stability and decrease his risk for falls or instability in the knee. He would benefit from additional skilled physical therapy intervention to improve his strength, flexibility and proprioceptive control as needed to improve his gait, stairs and transfers and to maximize his post-operative gains due to planned surgery in the fall.        Goals:   Goals: (to be met in 8 visits)  1. Patient will be independent in a progressive HEP to help manage symptoms and achieve functional independence in 3 sessions.MET  2. Patient will decrease his max pain to 4/10 as needed to improve his functional independence in 3 weeks. MET  3. Patient ill increase his knee ext to 0 deg to help normalize his gait pattern for heel strike MET   4. Patient will increase his quad strength to 4+/5 as needed to improve his endurance and  mechanics with gait  WORKING TOWARD   5. Patient will increase his hip extension MMT to 4/5 as needed to improve his strenght to support the knee with squatting to perform daily chores and cares WORKING TOWARD   6. Patient will increase his knee flexion to 135 deg or greater to decrease knee stiffness to improve mechanics and decrease compensations with stairs and transfers MET  7. Patient will demonstrate 10 lateral step downs on a 4 inch box to improve his eccentric stability for a decreased risk for falls during gait and transfers. WORKING TOWARD  8. Patient will increase his SLB on an unstable surface to greater than 25 sec as needed to improve his dynamic stability for walking in the community WORKING TOWARD           Plan: Continue skilled Physical Therapy 1 x/week or a total of 5 visits over a 90 day period prior to surgical intervention.  He will attend 1 x/week due to work travel.  Treatment will include: manual therapy, range of motion exercises, flexibility exercises, strengthening exercises, postural re-ed, neuromuscular re-education, CKC exercises, balance activities, stationary bike, and HEP instruction all per protocol to improve her functional independence    Patient/Family/Caregiver was advised of these findings, precautions, and treatment options and has agreed to actively participate in planning and for this course of care.    Thank you for your referral. If you have any questions, please contact me at Dept: [phone].    Sincerely,  Electronically signed by therapist: Marguerite Johns PT     Physician's certification required:  Yes  Please co-sign or sign and return this letter via fax as soon as possible to [phone].   I certify the need for these services furnished under this plan of treatment and while under my care.    X___________________________________________________ Date____________________    Certification From: 8/16/2024  To:11/14/2024           Date: 7/29/24  TX#:  7/10 Date: 8/16/24  TX#: 8/10 Date: 8/30/24  TX#: 9/15   Ther ex:   NuStep for ROM x 6 min, level 56 with arms  DKSA HS stretch 2 x 30\"     SLR 3# 2x10  SLR ER 0# 1 x10  SL hip abductions 2# 2x10    Heel raises on stair step x 20  Lateral step up 6\"  x 15  Forward step ups 6\" x15   Lateral walks blue TB x 3 laps  BB med/lat 1 x 60 sec  BB A/P 1 x 60 sec  Shuttle SL press 5 cords 2x15  SLS Airex 2 x 30 sec  Retro alt lunge x 10 each side    Lateral step down 2 x 8,  3 inch box  Fwd step up on bosu x 12 Ther ex:   NuStep for ROM x 6 min, level 6 with arms      SLR 4# 2x10  SLR ER 4# 1 x10  SL hip abductions 4# 2x10    Heel raises on stair step x 20  Lateral step up 6\"  x 15  Forward step ups 6\" x15   Lateral walks blue TB x 3 laps  BB center balance x 2 min  Shuttle SL press 5 cords 2x15  SLS dynadisk  x 30 sec  Retro alt lunge x 10 each side    Lateral step down 2 x 10,  4 inch box  Lateral reaches 2 x 10   Lateral lunges x 15  Fwd step up on bosu x 15  Lat step up on bosu x 15    SL squat to and from the plinth 2  x 8     SL bridge x 15, 5\"  DKSA HS stretch 2 x 30\" R  DKSA quad stretch 2 x 30\" R  DKSA ITB stretch 2 x 30\" R      Modalities:  IFC to the right knee , cont 23 V x 10 min               HEP: lateral step downs, leg raises and retro lunges, SL bridge, lat walks    Charges: 3 ther ex (40 min)       Total Timed Treatment: 40 min  Total Treatment Time: 41 min